# Patient Record
Sex: FEMALE | Race: WHITE | NOT HISPANIC OR LATINO | Employment: OTHER | URBAN - METROPOLITAN AREA
[De-identification: names, ages, dates, MRNs, and addresses within clinical notes are randomized per-mention and may not be internally consistent; named-entity substitution may affect disease eponyms.]

---

## 2017-02-17 ENCOUNTER — GENERIC CONVERSION - ENCOUNTER (OUTPATIENT)
Dept: OTHER | Facility: OTHER | Age: 82
End: 2017-02-17

## 2017-03-30 ENCOUNTER — NEW REFERRAL (OUTPATIENT)
Dept: URBAN - METROPOLITAN AREA CLINIC 27 | Facility: CLINIC | Age: 82
End: 2017-03-30

## 2017-03-30 DIAGNOSIS — H35.3211: ICD-10-CM

## 2017-03-30 DIAGNOSIS — H43.813: ICD-10-CM

## 2017-03-30 DIAGNOSIS — H35.3122: ICD-10-CM

## 2017-03-30 DIAGNOSIS — H35.61: ICD-10-CM

## 2017-03-30 PROCEDURE — 92235 FLUORESCEIN ANGRPH MLTIFRAME: CPT

## 2017-03-30 PROCEDURE — 2019F DILATED MACUL EXAM DONE: CPT

## 2017-03-30 PROCEDURE — G8427 DOCREV CUR MEDS BY ELIG CLIN: HCPCS

## 2017-03-30 PROCEDURE — 1036F TOBACCO NON-USER: CPT

## 2017-03-30 PROCEDURE — 92225 OPHTHALMOSCOPY (INITIAL): CPT

## 2017-03-30 PROCEDURE — 92134 CPTRZ OPH DX IMG PST SGM RTA: CPT

## 2017-03-30 PROCEDURE — 99204 OFFICE O/P NEW MOD 45 MIN: CPT | Mod: 25

## 2017-03-30 PROCEDURE — 67028 INJECTION EYE DRUG: CPT

## 2017-03-30 PROCEDURE — 4177F TALK PT/CRGVR RE AREDS PREV: CPT

## 2017-03-30 PROCEDURE — 92250 FUNDUS PHOTOGRAPHY W/I&R: CPT

## 2017-03-30 ASSESSMENT — TONOMETRY
OS_IOP_MMHG: 21
OD_IOP_MMHG: 20

## 2017-03-30 ASSESSMENT — VISUAL ACUITY: OS_SC: 20/30

## 2017-04-27 ENCOUNTER — FOLLOW UP (OUTPATIENT)
Dept: URBAN - METROPOLITAN AREA CLINIC 27 | Facility: CLINIC | Age: 82
End: 2017-04-27

## 2017-04-27 DIAGNOSIS — H35.3211: ICD-10-CM

## 2017-04-27 DIAGNOSIS — H35.61: ICD-10-CM

## 2017-04-27 PROCEDURE — 1036F TOBACCO NON-USER: CPT

## 2017-04-27 PROCEDURE — 2019F DILATED MACUL EXAM DONE: CPT

## 2017-04-27 PROCEDURE — 92012 INTRM OPH EXAM EST PATIENT: CPT | Mod: 25

## 2017-04-27 PROCEDURE — 67028 INJECTION EYE DRUG: CPT

## 2017-04-27 PROCEDURE — 92134 CPTRZ OPH DX IMG PST SGM RTA: CPT

## 2017-04-27 PROCEDURE — 4177F TALK PT/CRGVR RE AREDS PREV: CPT

## 2017-04-27 PROCEDURE — G8428 CUR MEDS NOT DOCUMENT: HCPCS

## 2017-04-27 ASSESSMENT — TONOMETRY: OD_IOP_MMHG: 19

## 2017-04-27 ASSESSMENT — VISUAL ACUITY: OD_SC: CF 1FT

## 2017-08-03 ENCOUNTER — FOLLOW UP (OUTPATIENT)
Dept: URBAN - METROPOLITAN AREA CLINIC 27 | Facility: CLINIC | Age: 82
End: 2017-08-03

## 2017-08-03 DIAGNOSIS — H35.3231: ICD-10-CM

## 2017-08-03 DIAGNOSIS — H35.61: ICD-10-CM

## 2017-08-03 PROCEDURE — 1036F TOBACCO NON-USER: CPT

## 2017-08-03 PROCEDURE — 92134 CPTRZ OPH DX IMG PST SGM RTA: CPT

## 2017-08-03 PROCEDURE — 92235 FLUORESCEIN ANGRPH MLTIFRAME: CPT

## 2017-08-03 PROCEDURE — 67028 INJECTION EYE DRUG: CPT

## 2017-08-03 PROCEDURE — G8427 DOCREV CUR MEDS BY ELIG CLIN: HCPCS

## 2017-08-03 PROCEDURE — 2019F DILATED MACUL EXAM DONE: CPT

## 2017-08-03 PROCEDURE — 4177F TALK PT/CRGVR RE AREDS PREV: CPT

## 2017-08-03 PROCEDURE — 92014 COMPRE OPH EXAM EST PT 1/>: CPT | Mod: 25

## 2017-08-03 ASSESSMENT — VISUAL ACUITY
OS_SC: 20/50
OD_SC: CF 1FT

## 2017-08-03 ASSESSMENT — TONOMETRY
OS_IOP_MMHG: 22
OD_IOP_MMHG: 22

## 2017-09-07 ENCOUNTER — FOLLOW UP (OUTPATIENT)
Dept: URBAN - METROPOLITAN AREA CLINIC 27 | Facility: CLINIC | Age: 82
End: 2017-09-07

## 2017-09-07 DIAGNOSIS — H35.61: ICD-10-CM

## 2017-09-07 DIAGNOSIS — H35.3231: ICD-10-CM

## 2017-09-07 PROCEDURE — G8428 CUR MEDS NOT DOCUMENT: HCPCS

## 2017-09-07 PROCEDURE — 92134 CPTRZ OPH DX IMG PST SGM RTA: CPT

## 2017-09-07 PROCEDURE — 67028 INJECTION EYE DRUG: CPT

## 2017-09-07 PROCEDURE — 92012 INTRM OPH EXAM EST PATIENT: CPT | Mod: 25

## 2017-09-07 PROCEDURE — 4177F TALK PT/CRGVR RE AREDS PREV: CPT

## 2017-09-07 PROCEDURE — 2019F DILATED MACUL EXAM DONE: CPT

## 2017-09-07 PROCEDURE — 1036F TOBACCO NON-USER: CPT

## 2017-09-07 ASSESSMENT — VISUAL ACUITY
OS_SC: 20/50
OD_SC: CF 2FT
OS_PH: 20/40-2

## 2017-09-07 ASSESSMENT — TONOMETRY
OD_IOP_MMHG: 22
OS_IOP_MMHG: 21

## 2017-10-19 ENCOUNTER — FOLLOW UP (OUTPATIENT)
Dept: URBAN - METROPOLITAN AREA CLINIC 27 | Facility: CLINIC | Age: 82
End: 2017-10-19

## 2017-10-19 DIAGNOSIS — H35.3231: ICD-10-CM

## 2017-10-19 DIAGNOSIS — H35.61: ICD-10-CM

## 2017-10-19 PROCEDURE — 92134 CPTRZ OPH DX IMG PST SGM RTA: CPT

## 2017-10-19 PROCEDURE — G8428 CUR MEDS NOT DOCUMENT: HCPCS

## 2017-10-19 PROCEDURE — 4177F TALK PT/CRGVR RE AREDS PREV: CPT

## 2017-10-19 PROCEDURE — 67028 INJECTION EYE DRUG: CPT

## 2017-10-19 PROCEDURE — 92012 INTRM OPH EXAM EST PATIENT: CPT | Mod: 25

## 2017-10-19 PROCEDURE — G9744 PT NOT ELI D/T ACT DIG HTN: HCPCS

## 2017-10-19 PROCEDURE — 2019F DILATED MACUL EXAM DONE: CPT

## 2017-10-19 PROCEDURE — 1036F TOBACCO NON-USER: CPT

## 2017-10-19 ASSESSMENT — VISUAL ACUITY
OS_SC: 20/40-
OS_PH: 20/30-

## 2017-10-19 ASSESSMENT — TONOMETRY
OS_IOP_MMHG: 19
OD_IOP_MMHG: 19

## 2018-01-04 ENCOUNTER — GENERIC CONVERSION - ENCOUNTER (OUTPATIENT)
Dept: OTHER | Facility: OTHER | Age: 83
End: 2018-01-04

## 2018-01-09 NOTE — RESULT NOTES
Verified Results  (1) COMPREHENSIVE METABOLIC PANEL [id number] 60:63MM [facility] Sa     Test Name Result Flag Reference   GLUCOSE,RANDM 127 mg/dL     If the patient is fasting, the ADA then defines impaired fasting glucose as > 100 mg/dL and diabetes as > or equal to 123 mg/dL  SODIUM 142 mmol/L  136-145   POTASSIUM 3 7 mmol/L  3 5-5 3   CHLORIDE 107 mmol/L  100-108   CARBON DIOXIDE 26 mmol/L  21-32   ANION GAP (CALC) 9 mmol/L  4-13   BLOOD UREA NITROGEN 14 mg/dL  5-25   CREATININE 0 90 mg/dL  0 60-1 30   Standardized to IDMS reference method   CALCIUM 8 6 mg/dL  8 3-10 1   BILI, TOTAL 0 40 mg/dL  0 20-1 00   ALK PHOSPHATAS 120 U/L H    ALT (SGPT) 17 U/L  12-78   AST(SGOT) 19 U/L  5-45   ALBUMIN 2 9 g/dL L 3 5-5 0   TOTAL PROTEIN 6 3 g/dL L 6 4-8 2   eGFR Non-African American 59 4 ml/min/1 73sq [facility] Disease Education Program recommendations are as follows:  GFR calculation is accurate only with a steady state creatinine  Chronic Kidney disease less than 60 ml/min/1 73 sq  meters  Kidney failure less than 15 ml/min/1 73 sq  meters       (1) URINALYSIS (will reflex a microscopy if leukocytes, occult blood, protein or nitrites are not within normal limits) [id number] 05:50AM [employer]     Test Name Result Flag Reference   COLOR Yellow     CLARITY Clear     SPECIFIC GRAVITY UA 1 010  1 000-1 030   PH UA 6 5  5 0-9 0   LEUKOCYTE ESTERASE UA Negative  Negative   NITRITE UA Negative  Negative   PROTEIN UA Negative mg/dl  Negative   GLUCOSE UA Negative mg/dl  Negative   KETONES UA Negative mg/dl  Negative   UROBILINOGEN UA 0 2 E U /dl  0 2, 1 0 E U /dl   BILIRUBIN UA Negative  Negative   BLOOD UA Negative  Negative     (1) CBC/ PLT (NO DIFF) [id number] 05:50AM Mayme DefenCall     Test Name Result Flag Reference   HEMATOCRIT 42 8 %  37 0-47 0   HEMOGLOBIN 13 7 g/dL  12 0-16 0   MCHC 32 1 g/dL  31 4-37 4   MCH 27 9 pg  27 0-31 0   MCV 87 fL  82-98   PLATELET COUNT 019 Thousands/uL  130-400   RBC COUNT 4 92 Million/uL  4 20-5  40   RDW 16 4 % H 11 6-15 1   WBC COUNT 8 50 Thousand/uL  4 80-10 80   MPV 9 7 fL  8 9-12 7

## 2018-01-10 NOTE — RESULT NOTES
Verified Results  (1) CBC/PLT/DIFF 27Apr2016 06:00AM [employer]     Test Name Result Flag Reference   WBC COUNT 8 10 Thousand/uL  4 80-10 80   WBC ADJUSTED 8 10 Thousand/uL  4 80-10 80   RBC COUNT 4 51 Million/uL  4 20-5 40   HEMOGLOBIN 13 1 g/dL  12 0-16 0   HEMATOCRIT 40 0 %  37 0-47 0   MCV 89 fL  82-98   MCH 29 1 pg  27 0-31 0   MCHC 32 7 g/dL  31 4-37 4   RDW 15 2 % H 11 6-15 1   MPV 9 8 fL  8 9-12 7   PLATELET COUNT 209 Thousands/uL  130-400   NEUTROPHILS RELATIVE PERCENT 68 %  43-75   LYMPHOCYTES RELATIVE PERCENT 18 %  14-44   MONOCYTES RELATIVE PERCENT 7 %  4-12   EOSINOPHILS RELATIVE PERCENT 7 % H 0-6   BASOPHILS RELATIVE PERCENT 0 %  0-1   NEUTROPHILS ABSOLUTE COUNT 5 50 Thousands/µL  1 85-7 62   LYMPHOCYTES ABSOLUTE COUNT 1 40 Thousands/µL  0 60-4 47   MONOCYTES ABSOLUTE COUNT 0 60 Thousand/µL  0 17-1 22   EOSINOPHILS ABSOLUTE COUNT 0 60 Thousand/µL  0 00-0 61   BASOPHILS ABSOLUTE COUNT 0 00 Thousands/µL  0 00-0 10     (1) COMPREHENSIVE METABOLIC PANEL [id number] 44:56XM [employer]   National Kidney Disease Education Program recommendations are as follows:  GFR calculation is accurate only with a steady state creatinine  Chronic Kidney disease less than 60 ml/min/1 73 sq  meters  Kidney failure less than 15 ml/min/1 73 sq  meters  Test Name Result Flag Reference   GLUCOSE,RANDM 134 mg/dL     If the patient is fasting, the ADA then defines impaired fasting glucose as > 100 mg/dL and diabetes as > or equal to 123 mg/dL     SODIUM 145 mmol/L  136-145   POTASSIUM 3 0 mmol/L L 3 5-5 3   CHLORIDE 107 mmol/L  100-108   CARBON DIOXIDE 30 mmol/L  21-32   ANION GAP (CALC) 8 mmol/L  4-13   BLOOD UREA NITROGEN 20 mg/dL  5-25   CREATININE 0 80 mg/dL  0 60-1 30   Standardized to IDMS reference method   CALCIUM 9 2 mg/dL  8 3-10 1   BILI, TOTAL 0 40 mg/dL  0 20-1 00   ALK PHOSPHATAS 183 U/L H    ALT (SGPT) 15 U/L  12-78   AST(SGOT) 22 U/L  5-45   ALBUMIN 2 8 g/dL L 3 5-5 0   TOTAL PROTEIN 6 3 g/dL L 6  4-8 2   eGFR Non-African American      >60 0 ml/min/1 73sq m     (1) HEMOGLOBIN A1C [id number] 06:00AM Mauro Plascencia   5 7-6 4% impaired fasting glucose  >=6 5% diagnosis of diabetes    Falsely low levels are seen in conditions linked to short RBC life span-  hemolytic anemia, and splenomegaly  Falsely elevated levels are seen in situations where there is an increased production of RBC- receipt of erythropoietin or blood transfusions  Adopted from ADA-Clinical Practice Recommendations     Test Name Result Flag Reference   HEMOGLOBIN A1C 6 9 % H 4 0-5 6   EST  AVG   GLUCOSE 151 mg/dl

## 2018-01-10 NOTE — MISCELLANEOUS
Message  Message Free Text Note Form: T/C from Becca Thompson pt with worsening confusion over the past 24 hours  Afebrile, VSS, urine dip + for WBC and RBC  Cipro 250mg BID x7 ordered        Signatures   Electronically signed by : Tristan Cedillo; Mar 13 2016  4:36PM EST                       (Author)    Electronically signed by : Cassy Carbone DO; Jun 1 2016  7:30PM EST                       (Review)

## 2018-01-11 ENCOUNTER — FOLLOW UP (OUTPATIENT)
Dept: URBAN - METROPOLITAN AREA CLINIC 27 | Facility: CLINIC | Age: 83
End: 2018-01-11

## 2018-01-11 DIAGNOSIS — H35.3231: ICD-10-CM

## 2018-01-11 DIAGNOSIS — H35.61: ICD-10-CM

## 2018-01-11 PROCEDURE — 92134 CPTRZ OPH DX IMG PST SGM RTA: CPT

## 2018-01-11 PROCEDURE — 67028 INJECTION EYE DRUG: CPT

## 2018-01-11 PROCEDURE — 92014 COMPRE OPH EXAM EST PT 1/>: CPT | Mod: 25

## 2018-01-11 ASSESSMENT — VISUAL ACUITY
OS_PH: 20/30
OS_SC: 20/40-

## 2018-01-11 ASSESSMENT — TONOMETRY
OS_IOP_MMHG: 23
OD_IOP_MMHG: 22

## 2018-01-11 NOTE — RESULT NOTES
Verified Results  * XR SPINE THORACIC 3 VIEW [id number] 12:00AM Burnadette Gangkrerman     Test Name Result Flag Reference   XR SPINE THORACIC 3 VW (Report)     THORACIC SPINE     INDICATION: Fall, posterior thoracic pain      COMPARISON: None     VIEWS: AP, lateral and coned-down projections; 3 images     FINDINGS:     There is dextroscoliosis of the lower thoracic spine  There is no subluxation      There is no fracture or pathologic bone lesion  Discogenic degenerative changes of the spine noted as well as bony osteopenia      The pedicles are intact  IMPRESSION:     No evidence of compression fracture       Signed by:   Judy Nugent MD   1/25/16     XR RIBS 2 VIEW RIGHT 25Jan2016 12:00AM Burnadette Gangkrerman     Test Name Result Flag Reference   XR RIBS 2 VW RIGHT (Report)     RIGHT RIBS AND CHEST DUAL-ENERGY     INDICATION: Patient fell, right posterior back pain     COMPARISON: None     VIEWS: Frontal chest with dual-energy technique, and 3 views right hemithorax; 6 images     FINDINGS:     The cardiomediastinal silhouette is unremarkable  Lungs are clear  No pleural effusions  There is no pneumothorax  No rib fractures are identified  IMPRESSION:     1  No active pulmonary disease  2  No evidence of rib fractures  Signed by:   Judy Nugent MD   1/25/16     * XR SPINE LUMBAR 2 OR 3 VIEWS 25Jan2016 12:00AM [employer] Gangkrerman     Test Name Result Flag Reference   XR SPINE LUMBAR 2 OR 3 VIEWS (Report)     LUMBAR SPINE     INDICATION: Fall, low back pain     COMPARISON: 3/6/2012     VIEWS: AP, lateral and coned-down projections; 3 images     FINDINGS:     Rotatory levoscoliosis of lumbar spine noted  There is no subluxation  There is no radiographic evidence of acute fracture or destructive osseous lesion  Multilevel degenerative disc disease of the lumbar spine noted as well as bilateral facet joint arthritis     Visualized soft tissues appear unremarkable         IMPRESSION:     No evidence of compression fracture or subluxation     Signed by:   Ming Chakraborty MD   1/25/16

## 2018-01-11 NOTE — RESULT NOTES
Verified Results  (1) URINE CULTURE 13Jun2016 05:50AM Natan Morales     Test Name Result Flag Reference   CLINICAL REPORT (Report)     Test:        Urine culture  Specimen Type:   Urine  Specimen Date:   6/13/2016 5:50 AM  Result Date:    6/14/2016 8:08 AM  Result Status:   Final result  Resulting Lab:   [place] [address]            Tel: [phone]      CULTURE                                       ------------------                                   No Growth <1000 cfu/mL

## 2018-01-11 NOTE — RESULT NOTES
Verified Results  * MRI BRAIN MEMORY WO CONTRAST 22Apr2016 12:54PM Juan David Clayton     Test Name Result Flag Reference   MRI BRAIN MEMORY WO CONTRAST (Report)     This is a summary report  The complete report is available in the patient's medical record  If you cannot access the medical record, please contact the sending organization for a detailed fax or copy  MRI BRAIN WITHOUT CONTRAST     INDICATION: Worsening dementia  Change in mental status  COMPARISON:  8/13/2012     TECHNIQUE: Sagittal T1, axial T2, axial FLAIR, axial T1, axial [place] and axial diffusion imaging  Coronal T2     IMAGE QUALITY: Diagnostic  FINDINGS:     BRAIN PARENCHYMA: There is no evidence of intra-axial or extra-axial hemorrhage  No midline shift or mass-effect is demonstrated  The ventricular system is not dilated out of proportion to the degree of parenchymal volume loss  The cerebral parenchyma    demonstrates signal abnormality in the periventricular white matter, compatible with mild microangiopathy  Age-appropriate parenchymal involution noted  The cerebellar tonsils are normal in position  There is no diffusion restriction to suggest recent infarction  VENTRICLES: Diffuse ventricular prominence is stable  SELLA AND PITUITARY GLAND: Normal      ORBITS: Bilateral lens implants noted     PARANASAL SINUSES: Normal      VASCULATURE: Evaluation of the major intracranial vasculature demonstrates appropriate flow voids  CALVARIUM AND SKULL BASE: Normal      EXTRACRANIAL SOFT TISSUES: Normal        IMPRESSION:       1  Minimal, chronic microangiopathy is stable  2  Stable diffuse ventricular prominence   3  No acute infarction, intracranial hemorrhage or mass effect  Workstation performed: [id number]     Signed by:    Renan Ceja MD   4/22/16

## 2018-01-12 ENCOUNTER — GENERIC CONVERSION - ENCOUNTER (OUTPATIENT)
Dept: OTHER | Facility: OTHER | Age: 83
End: 2018-01-12

## 2018-01-13 NOTE — RESULT NOTES
Verified Results  (1) URINALYSIS (will reflex a microscopy if leukocytes, occult blood, protein or nitrites are not within normal limits) [id number] 07:00PM [employer]     Test Name Result Flag Reference   COLOR Yellow     CLARITY Clear     SPECIFIC GRAVITY UA 1 025  1 000-1 030   PH UA 6 0  5 0-9 0   LEUKOCYTE ESTERASE UA Negative  Negative   NITRITE UA Negative  Negative   PROTEIN UA Negative mg/dl  Negative   GLUCOSE UA Negative mg/dl  Negative   KETONES UA Trace mg/dl A Negative   UROBILINOGEN UA 0 2 E U /dl A 0 2, 1 0 E U /dl   BILIRUBIN UA Negative  Negative   BLOOD UA Trace-lysed A Negative     (1) URINALYSIS (will reflex a microscopy if leukocytes, occult blood, protein or nitrites are not within normal limits) [id number] 07:00PM [employer]     Test Name Result Flag Reference   BACTERIA Occasional /hpf  None Seen, Occasional   EPITHELIAL CELLS Occasional /hpf  None Seen, Occasional   HYALINE CASTS 2-4 /lpf A (none)   RBC UA None Seen /hpf  None Seen   WBC UA 0-1 /hpf A None Seen

## 2018-01-16 NOTE — RESULT NOTES
Verified Results  (1) BASIC METABOLIC PROFILE [id number] 05:55AM Jenelle Salt     Test Name Result Flag Reference   GLUCOSE,RANDM 129 mg/dL     If the patient is fasting, the ADA then defines impaired fasting glucose as > 100 mg/dL and diabetes as > or equal to 123 mg/dL  SODIUM 142 mmol/L  136-145   POTASSIUM 3 8 mmol/L  3 5-5 3   CHLORIDE 109 mmol/L H 100-108   CARBON DIOXIDE 27 mmol/L  21-32   ANION GAP (CALC) 6 mmol/L  4-13   BLOOD UREA NITROGEN 19 mg/dL  5-25   CREATININE 0 80 mg/dL  0 60-1 30   Standardized to IDMS reference method   CALCIUM 8 4 mg/dL  8 3-10 1   eGFR Non-African American      >60 0 ml/min/1 73sq [facility] Disease Education Program recommendations are as follows:  GFR calculation is accurate only with a steady state creatinine  Chronic Kidney disease less than 60 ml/min/1 73 sq  meters  Kidney failure less than 15 ml/min/1 73 sq  meters

## 2018-01-16 NOTE — RESULT NOTES
Verified Results  (1) LIPID PANEL, FASTING [id number] 06:00AM Cesar Dejesus     Test Name Result Flag Reference   CHOLESTEROL 178 mg/dL     HDL,DIRECT 49 mg/dL  40-60   Specimen collection should occur prior to Metamizole administration due to the potential for falsely depressed results  LDL CHOLESTEROL CALCULATED 93 mg/dL  0-100   Triglyceride:         Normal              <150 mg/dl       Borderline High    150-199 mg/dl       High               200-499 mg/dl       Very High          >499 mg/dl  Cholesterol:         Desirable        <200 mg/dl      Borderline High  200-239 mg/dl      High             >239 mg/dl  HDL Cholesterol:        High    >59 mg/dL      Low     <41 mg/dL  LDL CALCULATED:    This screening LDL is a calculated result  It does not have the accuracy of the Direct Measured LDL in the monitoring of patients with hyperlipidemia and/or statin therapy  Direct Measure LDL (SGM647) must be ordered separately in these patients  TRIGLYCERIDES 180 mg/dL H <=150   Specimen collection should occur prior to N-Acetylcysteine or Metamizole administration due to the potential for falsely depressed results  (1) HEMOGLOBIN A1C [id number] 06:00AM Cesar Dejesus     Test Name Result Flag Reference   HEMOGLOBIN A1C 7 0 % H 4 2-6 3   EST  AVG   GLUCOSE 154 mg/dl

## 2018-01-16 NOTE — MISCELLANEOUS
Message  Message Free Text Note Form: [facility], pt had unwitnessed fall getting out of bed this evening  The bed alarm did not go off  There are mattresses on floor on either side of her bed  She has a skin tear to her right forearm and is not complaining of any pain  No neuro changes  Staff notified family and neuro checks initiated  Signatures   Electronically signed by : Sepideh Morales;  Apr 10 2016  9:44PM EST                       (Author)    Electronically signed by : Ce Garcia DO; Jun 1 2016  7:29PM EST                       (Review)

## 2018-01-16 NOTE — RESULT NOTES
Verified Results  (1) URINALYSIS (will reflex a microscopy if leukocytes, occult blood, protein or nitrites are not within normal limits) [id number] 12:00PM Suzanna Palacios     Test Name Result Flag Reference   COLOR Light Yellow     CLARITY Slightly Cloudy     SPECIFIC GRAVITY UA 1 015  1 000-1 030   PH UA 7 0  5 0-9 0   LEUKOCYTE ESTERASE UA Negative  Negative   NITRITE UA Negative  Negative   PROTEIN UA Negative mg/dl  Negative   GLUCOSE UA Negative mg/dl  Negative   KETONES UA Negative mg/dl  Negative   UROBILINOGEN UA 0 2 E U /dl A 0 2, 1 0 E U /dl   BILIRUBIN UA Negative  Negative   BLOOD UA Negative  Negative

## 2018-01-17 NOTE — RESULT NOTES
Verified Results  (1) URINE CULTURE [id number] 07:00PM Nga Kinney     Test Name Result Flag Reference   CLINICAL REPORT (Report)     Test:        Urine culture  Specimen Type:   Urine  Specimen Date:   3/18/2016 7:00 PM  Result Date:    3/19/2016 6:38 PM  Result Status:   Final result  Resulting Lab:   [facility] [address] [place] [address]            Tel: [phone]                 CULTURE                                       ------------------                                   No Growth <1000 cfu/mL

## 2018-01-23 NOTE — CONSULTS
Chief Complaint    1  Shoulder Pain    History of Present Illness   Alethea Shepard is an 59-year-old female who is referred to see me today from the hospital after having suffered a left shoulder dislocation 2 weeks ago  She had a reduction performed in the emergency room and did well with that  Her pain is now mild and dull and intermittent  It was made better with the reduction and with the use of a sling and had been worse with a lot of movement of the shoulder  It can radiate down and up into the neck  She denies any numbness  Review of Systems  Focused-Female Orthopedics:   Constitutional: No fever, no chills, feels well, no tiredness, no recent weight gain or loss  Eyes: No complaints of eyesight problems, no red eyes  ENT: no loss of hearing, no nosebleeds, no sore throat  Cardiovascular: No complaints of chest pain, no palpitations, no leg claudication or lower extremity edema  Respiratory: no compliants of shortness of breath, no wheezing, no cough  Gastrointestinal: no complaints of abdominal pain, no constipation, no nausea or diarrhea, no vomiting, no bloody stools  Genitourinary: no complaints of dysuria, no incontinence  Musculoskeletal: as noted in HPI  Integumentary: no complaints of skin rash or lesion, no itching or dry skin, no skin wounds  Neurological: no complaints of headache, no confusion, no numbness or tingling, no dizziness  Endocrine: No complaints of muscle weakness, no feelings of weakness, no frequent urination, no excessive thirst    Psychiatric: No suicidal thoughts, no anxiety, no feelings of depression  Active Problems    1  Allergic rhinitis (477 9) (J30 9)   2  Alzheimers disease (331 0) (G30 9)   3  Anxiety disorder (300 00) (F41 9)   4  Arthropathy (716 90) (M12 9)   5  Atrial fibrillation (427 31) (I48 91)   6  Benign essential hypertension (401 1) (I10)   7  Bunion, left foot (727 1) (M20 12)   8  Cataract (366 9) (H26 9)   9   Chronic kidney disease, stage I (585 1) (N18 1)   10  Contusion of right back wall of thorax, initial encounter (922 33) (S20 221A)   11  Depression (311) (F32 9)   12  Difficulty in walking (719 7) (R26 2)   13  Disc degeneration, 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)   14  Dislocation of left shoulder joint, subsequent encounter (V58 89) (S43 005D)   15  Essential hypertriglyceridemia (272 1) (E78 1)   16  Fatigue (780 79) (R53 83)   17  Hyperlipidemia (272 4) (E78 5)   18  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)   19  Initial Medicare annual wellness visit (V70 0) (Z00 00)   20  Insomnia (780 52) (G47 00)   21  Left shoulder pain (719 41) (M25 512)   22  Lumbago (724 2) (M54 5)   23  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)   24  Need for influenza vaccination (V04 81) (Z23)   25  Need for vaccination for DTP (V06 1) (Z23)   26  Overactive bladder (596 51) (N32 81)   27  Overweight (278 02) (E66 3)   28  Premature ventricular contraction (427 69) (I49 3)   29  Radius and ulna distal fracture (813 44) (S52 509A,S52 609A)   30  Recurrent falls (V15 88) (R29 6)   31  Right ankle pain (719 47) (M25 571)   32  Screening for osteoporosis (V82 81) (Z13 820)   33  Tear of skin of right wrist, initial encounter (881 02) (S61 511A)   34  Type 2 diabetes mellitus with renal manifestations, 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)   35  Urinary incontinence (788 30) (R32)    Past Medical History    1  History of Abdominal pain, epigastric (789 06) (R10 13)   2  History of Acute upper respiratory infection (465 9) (J06 9)   3  Ankle pain, unspecified laterality   4  History of Changes in skin texture (782 8) (R23 4)   5  History of Closed Fracture Of The Lateral Malleolus (824 2)   6  History of acute bronchitis (V12 69) (Z87 09)   7  History of acute sinusitis (V12 69) (Z87 09)   8  History of dermatitis (V13 3) (Z87 2)   9  Impacted cerumen, unspecified laterality (380 4) (H61 20)   10  Impaired fasting glucose (790 21) (R73 01)   11   Influenza vaccine needed (V04 81) (Z23)   12  History of Labyrinthine dysfunction (386 50) (H83 2X9)   13  Need for pneumococcal vaccination (V03 82) (Z23)   14  Screening for genitourinary condition (V81 6) (Z13 89)   15  Screening for neurological condition (V80 09) (Z13 89)  Active Problems And Past Medical History Reviewed: The active problems and past medical history were reviewed and updated today  Surgical History    1  History of Ankle Surgery   2  History of Cholecystectomy   3  History of Hysterectomy   4  History of Radical Pancreaticoduodenectomy  Surgical History Reviewed: The surgical history was reviewed and updated today  Family History    1  Family history of cardiac disorder (V17 49) (Z82 49)   2  No pertinent family history    3  No pertinent family history  Family History Reviewed: The family history was reviewed and updated today  Social History    · Former smoker (P17 41) (W09 773)   · Never a smoker   · No alcohol use  Social History Reviewed: The social history was reviewed and updated today  Current Meds   1  AmLODIPine Besylate 10 MG Oral Tablet; take one tablet by mouth every day; Therapy: 81KME1030 to (Evaluate:25Ovd1923)  Requested for: 27TDZ2864; Last   Rx:05Jan2016 Ordered   2  Donepezil HCl - 10 MG Oral Tablet (Aricept); Take 1 tablet daily  Requested for:   66QSI5530; Last Rx:05Jan2016 Ordered   3  Donepezil HCl - 10 MG Oral Tablet; take one tablet by mouth every day; Therapy: 88HRB2313 to (Evaluate:83Vyx7700)  Requested for: 80RAH8548; Last   Rx:39Tud8364 Ordered   4  DULoxetine HCl - 60 MG Oral Capsule Delayed Release Particles (Cymbalta); TAKE 2   CAPSULE Daily  Requested for: 10VDK2666; Last Rx:05Jan2016 Ordered   5  Memantine HCl - 5 MG Oral Tablet (Namenda); TAKE 1 TABLET ONCE DAILY; Therapy: 58VSU0633 to (Kavitha Perez)  Requested for: 19RUW1900; Last   Rx:05Jan2016 Ordered   6  MetFORMIN HCl - 500 MG Oral Tablet; Take 1 tablet daily;    Therapy: 84WYK1324 to (Evaluate:33Wcn6664)  Requested for: 91KLZ6374; Last   Rx:05Jan2016 Ordered  Medication List Reviewed: The medication list was reviewed and updated today  Allergies    1  No Known Drug Allergies    Physical Exam    Forward flexion: painful restricted AROM 120 degrees  Abduction: painful restricted AROM 110 degrees  Internal rotation: painful restricted AROM  External rotation: painful normal AROM  Motor: 4/5 forward flexion, 4/5 abduction, 4/5 internal rotation and 4/5 external rotation  Special Tests: positive Painful Arc and equivocal Empty Can test, but negative Neer test, negative Anterior Load and Shift and negative Posterior Load and Shift  Left Shoulder Appearance[de-identified] Normal  Tenderness: None  Constitutional - General appearance: Normal    Musculoskeletal - Gait and station: Abnormal  Gait evaluation demonstrated non-weight bearing bilaterally  Digits and nails: Normal  Muscle strength/tone: Normal  Upper extremity compartments: Normal    Cardiovascular - Pulses: Normal  Examination of extremities for edema and/or varicosities: Normal    Skin - Skin and subcutaneous tissue: Normal    Neurologic - Sensation: Normal  Upper extremity peripheral neuro exam: Normal  Peripheral sensation findings: involved side light touch intact on the upper arm, involved side light touch intact on the forearm and involved side light touch intact on the digits  Neuromuscular strength examination findings: involved side normal axillary nerve motor function, involved side normal median nerve motor function, involved side normal musculcutaneous nerve motor function, involved side normal radial nerve motor function and involved side normal ulnar nerve motor function  Distal pulse examination findings: involved side 2+ radial pulse     Psychiatric - Orientation to person, place, and time: Normal  Mood and affect: Normal    Eyes   Conjunctiva and lids: Normal     Pupils and irises: Normal        Results/Data  Diagnostic Studies Reviewed: I personally reviewed the films/images/results in the office today  My interpretation follows  X-ray Review Located left glenohumeral joint no fracture found in the left and x-rays  Post reduction  Assessment    1  Dislocation of left shoulder joint, initial encounter (831 00) (S43 005A)    Plan   I did discuss with Sebastián Randolph and her family that this single dislocation of the left shoulder does not appear to be something that should be a recurrent episode  I have taken her out of her sling and encourage range of motion and physical therapy  She will follow up with me in 6 weeks  I do not believe she will need an operation for this although the rotator cuff certainly is at risk but I believe we can rehabilitate her conservatively  Discussion/Summary  Counseling Documentation With Imm: The patient, patient's family was counseled regarding diagnostic results, instructions for management, prognosis, patient and family education, impressions, risks and benefits of treatment options, importance of compliance with treatment        Future Appointments    Signatures   Electronically signed by : GEORGES Root ; Mar  9 2016  1:53PM EST                       (Author)

## 2018-01-24 VITALS
SYSTOLIC BLOOD PRESSURE: 122 MMHG | DIASTOLIC BLOOD PRESSURE: 68 MMHG | HEIGHT: 61 IN | WEIGHT: 150 LBS | BODY MASS INDEX: 28.32 KG/M2

## 2018-03-06 ENCOUNTER — TELEPHONE (OUTPATIENT)
Dept: PAIN MEDICINE | Facility: CLINIC | Age: 83
End: 2018-03-06

## 2018-03-06 ENCOUNTER — OFFICE VISIT (OUTPATIENT)
Dept: OBGYN CLINIC | Facility: CLINIC | Age: 83
End: 2018-03-06
Payer: MEDICARE

## 2018-03-06 VITALS
HEART RATE: 65 BPM | SYSTOLIC BLOOD PRESSURE: 117 MMHG | WEIGHT: 154 LBS | HEIGHT: 61 IN | BODY MASS INDEX: 29.07 KG/M2 | DIASTOLIC BLOOD PRESSURE: 48 MMHG

## 2018-03-06 DIAGNOSIS — G89.29 CHRONIC MIDLINE LOW BACK PAIN WITHOUT SCIATICA: ICD-10-CM

## 2018-03-06 DIAGNOSIS — M53.3 PAIN OF RIGHT SACROILIAC JOINT: Primary | ICD-10-CM

## 2018-03-06 DIAGNOSIS — M54.50 CHRONIC MIDLINE LOW BACK PAIN WITHOUT SCIATICA: ICD-10-CM

## 2018-03-06 PROCEDURE — 99213 OFFICE O/P EST LOW 20 MIN: CPT | Performed by: ORTHOPAEDIC SURGERY

## 2018-03-06 NOTE — TELEPHONE ENCOUNTER
Intake started at [facility] office patient ref by Dr Doug García for pain of rt side sacroiliac joint  Patient scheduled and new patient paperwork given

## 2018-03-06 NOTE — PROGRESS NOTES
Assessment/Plan:  1  Pain of right sacroiliac joint  Ambulatory referral to Pain Management   2  Chronic midline low back pain without sciatica         Indira Bermudez continues to have right-sided low back and buttock pain and I do believe most of her discomfort is around the SI joint on the right side  I have referred her to Dr Kayley Betancourt to discuss possible SI joint injection as this could help with her pain  She should continue physical therapy in remain as active as possible  Subjective:   Olivia Sharpe is a 80 y o  female who presents for follow-up for right-sided SI joint pain and midline low back pain  She has been doing physical therapy at [facility] for the past 6 weeks and states that she continues to have right-sided buttock pain that worsens when sleeping or moving  She does feel better in therapy but the pain does not improve when she is sedentary  Review of Systems   Constitutional: Negative for chills, fever and unexpected weight change  HENT: Negative for hearing loss, nosebleeds and sore throat  Eyes: Negative for pain, redness and visual disturbance  Respiratory: Negative for cough, shortness of breath and wheezing  Cardiovascular: Negative for chest pain, palpitations and leg swelling  Gastrointestinal: Negative for abdominal pain, nausea and vomiting  Endocrine: Negative for polydipsia and polyuria  Genitourinary: Negative for dysuria and hematuria  Skin: Negative for rash and wound  Neurological: Negative for dizziness, numbness and headaches  Psychiatric/Behavioral: Negative for decreased concentration and suicidal ideas  The patient is not nervous/anxious            Past Medical History:   Diagnosis Date    Arthritis     Atrial fibrillation ([facility] [address] [facility]     Questionable    Dementia     Diabetes mellitus ([facility] )     Hypertension     Psychiatric disorder        Past Surgical History:   Procedure Laterality Date    CHOLECYSTECTOMY      FRACTURE SURGERY      left wrist , plates and pins, right ankle plate and pins    HYSTERECTOMY      WHIPPLE PROCEDURE W/ LAPAROSCOPY         Family History   Problem Relation Age of Onset    Family history unknown: Yes       Social History     Occupational History    Not on file  Social History Main Topics    Smoking status: Never Smoker    Smokeless tobacco: Not on file    Alcohol use No    Drug use: No    Sexual activity: Not Currently         Current Outpatient Prescriptions:     acetaminophen (TYLENOL) 325 mg tablet, Take 2 tablets (650 mg total) by mouth every 6 (six) hours as needed for mild pain , Disp: 30 tablet, Rfl: 0    ALPRAZolam (XANAX) 0 5 mg tablet, Take 0 5 mg by mouth daily at bedtime as needed for anxiety, Disp: , Rfl:     amiodarone 100 mg tablet, Take 100 mg by mouth daily, Disp: , Rfl:     amLODIPine (NORVASC) 5 mg tablet, Take 5 mg by mouth daily, Disp: , Rfl:     apixaban (ELIQUIS) 2 5 mg, Take 2 5 mg by mouth 2 (two) times a day, Disp: , Rfl:     cefadroxil (DURICEF) 500 mg capsule, Take 500 mg by mouth 2 (two) times a day, Disp: , Rfl:     donepezil (ARICEPT) 10 mg tablet, Take 10 mg by mouth daily at bedtime  , Disp: , Rfl:     DULoxetine (CYMBALTA) 60 mg delayed release capsule, Take 60 mg by mouth daily  , Disp: , Rfl:     memantine (NAMENDA) 10 mg tablet, Take 5 mg by mouth daily  , Disp: , Rfl:     metFORMIN (GLUCOPHAGE) 500 mg tablet, Take 500 mg by mouth daily with breakfast , Disp: , Rfl:     potassium chloride (K-DUR,KLOR-CON) 10 mEq tablet, Take 10 mEq by mouth 2 (two) times a day, Disp: , Rfl:     No Known Allergies    Objective:  Vitals:    03/06/18 1408   BP: (!) 117/48   Pulse: 65       Back Exam     Tenderness   The patient is experiencing tenderness in the lumbar (Pinpoint tenderness to palpation over SI joint on the right)      Tests   Straight leg raise right: negative  Straight leg raise left: negative    Other   Sensation: normal  Gait: normal             Physical Exam Constitutional: She is oriented to person, place, and time  She appears well-developed and well-nourished  HENT:   Head: Normocephalic and atraumatic  Eyes: Conjunctivae are normal    Neck: Neck supple  Cardiovascular: Intact distal pulses  Pulmonary/Chest: Effort normal    Neurological: She is alert and oriented to person, place, and time  Skin: Skin is warm and dry  Psychiatric: She has a normal mood and affect  Her behavior is normal    Vitals reviewed

## 2018-03-27 ENCOUNTER — APPOINTMENT (OUTPATIENT)
Dept: RADIOLOGY | Facility: CLINIC | Age: 83
End: 2018-03-27
Payer: MEDICARE

## 2018-03-27 ENCOUNTER — CONSULT (OUTPATIENT)
Dept: PAIN MEDICINE | Facility: CLINIC | Age: 83
End: 2018-03-27
Payer: MEDICARE

## 2018-03-27 VITALS
HEART RATE: 68 BPM | DIASTOLIC BLOOD PRESSURE: 62 MMHG | TEMPERATURE: 97.8 F | RESPIRATION RATE: 18 BRPM | SYSTOLIC BLOOD PRESSURE: 132 MMHG | HEIGHT: 61 IN | BODY MASS INDEX: 29.07 KG/M2 | WEIGHT: 154 LBS

## 2018-03-27 DIAGNOSIS — M47.816 LUMBAR SPONDYLOSIS: ICD-10-CM

## 2018-03-27 DIAGNOSIS — G89.4 CHRONIC PAIN SYNDROME: Primary | ICD-10-CM

## 2018-03-27 DIAGNOSIS — G89.4 CHRONIC PAIN SYNDROME: ICD-10-CM

## 2018-03-27 DIAGNOSIS — G89.29 CHRONIC BILATERAL LOW BACK PAIN WITHOUT SCIATICA: ICD-10-CM

## 2018-03-27 DIAGNOSIS — M54.50 CHRONIC BILATERAL LOW BACK PAIN WITHOUT SCIATICA: ICD-10-CM

## 2018-03-27 PROCEDURE — 72114 X-RAY EXAM L-S SPINE BENDING: CPT

## 2018-03-27 PROCEDURE — 99204 OFFICE O/P NEW MOD 45 MIN: CPT | Performed by: ANESTHESIOLOGY

## 2018-03-27 NOTE — LETTER
March 27, 2018     Lorena Caruso, [address] [address]    Patient: Louise Rae   YOB: 1929   Date of Visit: 3/27/2018       Dear Dr Kari Patel: Thank you for referring Balwinder Palm to me for evaluation  Below are my notes for this consultation  If you have questions, please do not hesitate to call me  I look forward to following your patient along with you  Sincerely,        Bhanu Ambriz MD        CC: MD Bhanu Chambers MD  3/27/2018  2:41 PM  Sign at close encounter  Assessment:  1  Chronic pain syndrome    2  Chronic bilateral low back pain without sciatica    3  Lumbar spondylosis        Plan:  Orders Placed This Encounter   Procedures    XR spine lumbar complete w bending minimum 6 views     Standing Status:   Future     Standing Expiration Date:   3/27/2022     Scheduling Instructions:      Bring along any outside films relating to this procedure  Order Specific Question:   Reason for Exam:     Answer:   low back pain     My impressions and treatment recommendations were discussed in detail with the patient, who verbalized understanding and had no further questions  Given that the patient has signs and symptoms consistent with bilateral lumbar facet syndrome and lumbar spondylosis, discussed the rationale of undergoing bilateral L3, L4, L5, and S1 diagnostic and confirmatory medial branch blocks  The procedures, its risks, and benefits were explained in detail to the patient  Risks include but are not limited to bleeding, infection, hematoma formation, abscess formation, weakness, headache, failure the pain to improve, nerve irritation or damage, and potential worsening of the pain  The patient verbalized understanding and wished to proceed with the procedure      I did feel reasonable to obtain an x-ray of the lumbar spine to evaluate for any other pathology that may be contributing to the patient's pain complaints  Follow-up is planned in 4 weeks time or sooner as warranted  Discharge instructions were provided  I personally saw and examined the patient and I agree with the above discussed plan of care  History of Present Illness:    Sulema Waddell is a 80 y o  female who presents to [facility] for initial evaluation of the above stated pain complaints  The patient has a past medical and chronic pain history as outlined in the assessment section  She was referred by Dr Washington Mayo  The patient reports a 5 year history of low back pain  She describes her pain as moderate to severe and 7/10 on the verbal numerical pain rating scale  Her pain is intermittent in nature and worse in the evening and night  She describes her pain as primarily in her low back and nonradiating in nature  She does report that her pain is in sharp and shooting    The patient reports weakness in her lower extremities  She reports no pain relieving factors, but does state that lying down, standing, bending, walking, and relaxation increases her pain  The patient does report that physical therapy, home exercises, and heat/ice treatment all provided moderate pain relief  She is currently using Tylenol over-the-counter as needed for pain relief  She does report that her pain is improved some of the time with the Tylenol  She is currently a long-term resident at a nursing home  Review of Systems:    Review of Systems   Constitutional: Negative for fever and unexpected weight change  HENT: Negative for trouble swallowing  Eyes: Negative for visual disturbance  Respiratory: Negative for shortness of breath and wheezing  Cardiovascular: Negative for chest pain and palpitations  Gastrointestinal: Negative for constipation, diarrhea, nausea and vomiting  Endocrine: Negative for cold intolerance, heat intolerance and polydipsia  Genitourinary: Positive for difficulty urinating   Negative for frequency  Musculoskeletal: Positive for gait problem (decreased range of motion)  Negative for arthralgias, joint swelling and myalgias  Skin: Negative for rash  Neurological: Negative for dizziness, seizures, syncope, weakness and headaches  Hematological: Does not bruise/bleed easily  Psychiatric/Behavioral: Negative for dysphoric mood  All other systems reviewed and are negative  Patient Active Problem List   Diagnosis    Shoulder dislocation    Dizziness    Atrial fibrillation (HCC)    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 )     Questionable    Dementia     Diabetes mellitus ([facility] )     Hypertension     Psychiatric disorder        Past Surgical History:   Procedure Laterality Date    CHOLECYSTECTOMY      FRACTURE SURGERY      left wrist , plates and pins, right ankle plate and pins    HYSTERECTOMY      WHIPPLE PROCEDURE W/ LAPAROSCOPY         Family History   Problem Relation Age of Onset    Family history unknown: Yes       Social History     Occupational History    Not on file       Social History Main Topics    Smoking status: Never Smoker    Smokeless tobacco: Not on file    Alcohol use No    Drug use: No    Sexual activity: Not Currently         Current Outpatient Prescriptions:     acetaminophen (TYLENOL) 325 mg tablet, Take 2 tablets (650 mg total) by mouth every 6 (six) hours as needed for mild pain , Disp: 30 tablet, Rfl: 0    ALPRAZolam (XANAX) 0 5 mg tablet, Take 0 5 mg by mouth daily at bedtime as needed for anxiety, Disp: , Rfl:     amiodarone 100 mg tablet, Take 100 mg by mouth daily, Disp: , Rfl:     amLODIPine (NORVASC) 5 mg tablet, Take 5 mg by mouth daily, Disp: , Rfl:     apixaban (ELIQUIS) 2 5 mg, Take 2 5 mg by mouth 2 (two) times a day, Disp: , Rfl:     cefadroxil (DURICEF) 500 mg capsule, Take 500 mg by mouth 2 (two) times a day, Disp: , Rfl:     donepezil (ARICEPT) 10 mg tablet, Take 10 mg by mouth daily at bedtime  , Disp: , Rfl:     DULoxetine (CYMBALTA) 60 mg delayed release capsule, Take 60 mg by mouth daily  , Disp: , Rfl:     memantine (NAMENDA) 10 mg tablet, Take 5 mg by mouth daily  , Disp: , Rfl:     metFORMIN (GLUCOPHAGE) 500 mg tablet, Take 500 mg by mouth daily with breakfast , Disp: , Rfl:     potassium chloride (K-DUR,KLOR-CON) 10 mEq tablet, Take 10 mEq by mouth 2 (two) times a day, Disp: , Rfl:     No Known Allergies    Physical Exam:    /62 (BP Location: Left arm, Patient Position: Sitting, Cuff Size: Standard)   Pulse 68   Temp 97 8 °F (36 6 °C) (Oral)   Resp 18   Ht 5' 1" (1 549 m)   Wt 69 9 kg (154 lb)   BMI 29 10 kg/m²      Constitutional: normal, well developed, well nourished, alert, in no distress and non-toxic and no overt pain behavior  Eyes: anicteric  HEENT: grossly intact  Neck: supple, symmetric, trachea midline and no masses   Pulmonary:even and unlabored  Cardiovascular:No edema or pitting edema present  Skin:Normal without rashes or lesions and well hydrated  Psychiatric:Mood and affect appropriate  Neurologic:Cranial Nerves II-XII grossly intact  Musculoskeletal:in wheelchair, the patient rises from a seated to a standing position with significant difficulty, push-off, and delay  Gait is significantly antalgic      Lumbar Spine Exam    Appearance:  Normal lordosis  Palpation/Tenderness:  left lumbar paraspinal tenderness  right lumbar paraspinal tenderness  Sensory:  no sensory deficits noted  Range of Motion:  Flexion:  Severely limited  with pain  Extension:  Severely limited  with pain  Lateral Flexion - Left:  Severely limited  with pain  Lateral Flexion - Right:  Severely limited  with pain  Rotation - Left:  Severely limited  with pain  Rotation - Right:  Severely limited  with pain Lumbar facet loading is positive bilaterally    Motor Strength:  Left hip flexion:  5/5  Left hip extension:  5/5  Right hip flexion:  5/5  Right hip extension:  5/5  Left knee flexion:  5/5  Left knee extension:  5/5  Right knee flexion:  5/5  Right knee extension:  5/5  Left foot dorsiflexion:  5/5  Left foot plantar flexion:  5/5  Right foot dorsiflexion:  5/5  Right foot plantar flexion:  5/5  Reflexes:  Left Patellar:  2+   Right Patellar:  2+   Left Achilles:  2+   Right Achilles:  2+   Special Tests:  Left Straight Leg Test:  negative  Right Straight Leg Test:  negative  Left Rigo's Maneuver:  negative  Right Rigo's Maneuver:  negative    Imaging  XR spine lumbar complete w bending minimum 6 views    (Results Pending)       Orders Placed This Encounter   Procedures    XR spine lumbar complete w bending minimum 6 views

## 2018-03-27 NOTE — PROGRESS NOTES
Assessment:  1  Chronic pain syndrome    2  Chronic bilateral low back pain without sciatica    3  Lumbar spondylosis        Plan:  Orders Placed This Encounter   Procedures    XR spine lumbar complete w bending minimum 6 views     Standing Status:   Future     Standing Expiration Date:   3/27/2022     Scheduling Instructions:      Bring along any outside films relating to this procedure  Order Specific Question:   Reason for Exam:     Answer:   low back pain     My impressions and treatment recommendations were discussed in detail with the patient, who verbalized understanding and had no further questions  Given that the patient has signs and symptoms consistent with bilateral lumbar facet syndrome and lumbar spondylosis, discussed the rationale of undergoing bilateral L3, L4, L5, and S1 diagnostic and confirmatory medial branch blocks  The procedures, its risks, and benefits were explained in detail to the patient  Risks include but are not limited to bleeding, infection, hematoma formation, abscess formation, weakness, headache, failure the pain to improve, nerve irritation or damage, and potential worsening of the pain  The patient verbalized understanding and wished to proceed with the procedure  I did feel reasonable to obtain an x-ray of the lumbar spine to evaluate for any other pathology that may be contributing to the patient's pain complaints  Follow-up is planned in 4 weeks time or sooner as warranted  Discharge instructions were provided  I personally saw and examined the patient and I agree with the above discussed plan of care  History of Present Illness:    Nadia Villarreal is a 80 y o  female who presents to [facility] for initial evaluation of the above stated pain complaints  The patient has a past medical and chronic pain history as outlined in the assessment section  She was referred by Dr Gonzalez Poag      The patient reports a 5 year history of low back pain  She describes her pain as moderate to severe and 7/10 on the verbal numerical pain rating scale  Her pain is intermittent in nature and worse in the evening and night  She describes her pain as primarily in her low back and nonradiating in nature  She does report that her pain is in sharp and shooting    The patient reports weakness in her lower extremities  She reports no pain relieving factors, but does state that lying down, standing, bending, walking, and relaxation increases her pain  The patient does report that physical therapy, home exercises, and heat/ice treatment all provided moderate pain relief  She is currently using Tylenol over-the-counter as needed for pain relief  She does report that her pain is improved some of the time with the Tylenol  She is currently a long-term resident at a nursing home  Review of Systems:    Review of Systems   Constitutional: Negative for fever and unexpected weight change  HENT: Negative for trouble swallowing  Eyes: Negative for visual disturbance  Respiratory: Negative for shortness of breath and wheezing  Cardiovascular: Negative for chest pain and palpitations  Gastrointestinal: Negative for constipation, diarrhea, nausea and vomiting  Endocrine: Negative for cold intolerance, heat intolerance and polydipsia  Genitourinary: Positive for difficulty urinating  Negative for frequency  Musculoskeletal: Positive for gait problem (decreased range of motion)  Negative for arthralgias, joint swelling and myalgias  Skin: Negative for rash  Neurological: Negative for dizziness, seizures, syncope, weakness and headaches  Hematological: Does not bruise/bleed easily  Psychiatric/Behavioral: Negative for dysphoric mood  All other systems reviewed and are negative          Patient Active Problem List   Diagnosis    Shoulder dislocation    Dizziness    Atrial fibrillation ([address] [address]    Other benign neoplasm of skin of left upper limb, including shoulder    Chronic pain syndrome    Chronic bilateral low back pain without sciatica    Lumbar spondylosis       Past Medical History:   Diagnosis Date    Arthritis     Atrial fibrillation ([facility] [address] [facility]     Questionable    Dementia     Diabetes mellitus ([facility] [address] [facility]     Hypertension     Psychiatric disorder        Past Surgical History:   Procedure Laterality Date    CHOLECYSTECTOMY      FRACTURE SURGERY      left wrist , plates and pins, right ankle plate and pins    HYSTERECTOMY      WHIPPLE PROCEDURE W/ LAPAROSCOPY         Family History   Problem Relation Age of Onset    Family history unknown: Yes       Social History     Occupational History    Not on file  Social History Main Topics    Smoking status: Never Smoker    Smokeless tobacco: Not on file    Alcohol use No    Drug use: No    Sexual activity: Not Currently         Current Outpatient Prescriptions:     acetaminophen (TYLENOL) 325 mg tablet, Take 2 tablets (650 mg total) by mouth every 6 (six) hours as needed for mild pain , Disp: 30 tablet, Rfl: 0    ALPRAZolam (XANAX) 0 5 mg tablet, Take 0 5 mg by mouth daily at bedtime as needed for anxiety, Disp: , Rfl:     amiodarone 100 mg tablet, Take 100 mg by mouth daily, Disp: , Rfl:     amLODIPine (NORVASC) 5 mg tablet, Take 5 mg by mouth daily, Disp: , Rfl:     apixaban (ELIQUIS) 2 5 mg, Take 2 5 mg by mouth 2 (two) times a day, Disp: , Rfl:     cefadroxil (DURICEF) 500 mg capsule, Take 500 mg by mouth 2 (two) times a day, Disp: , Rfl:     donepezil (ARICEPT) 10 mg tablet, Take 10 mg by mouth daily at bedtime  , Disp: , Rfl:     DULoxetine (CYMBALTA) 60 mg delayed release capsule, Take 60 mg by mouth daily  , Disp: , Rfl:     memantine (NAMENDA) 10 mg tablet, Take 5 mg by mouth daily  , Disp: , Rfl:     metFORMIN (GLUCOPHAGE) 500 mg tablet, Take 500 mg by mouth daily with breakfast , Disp: , Rfl:     potassium chloride (K-DUR,KLOR-CON) 10 mEq tablet, Take 10 mEq by mouth 2 (two) times a day, Disp: , Rfl:     No Known Allergies    Physical Exam:    /62 (BP Location: Left arm, Patient Position: Sitting, Cuff Size: Standard)   Pulse 68   Temp 97 8 °F (36 6 °C) (Oral)   Resp 18   Ht 5' 1" (1 549 m)   Wt 69 9 kg (154 lb)   BMI 29 10 kg/m²     Constitutional: normal, well developed, well nourished, alert, in no distress and non-toxic and no overt pain behavior  Eyes: anicteric  HEENT: grossly intact  Neck: supple, symmetric, trachea midline and no masses   Pulmonary:even and unlabored  Cardiovascular:No edema or pitting edema present  Skin:Normal without rashes or lesions and well hydrated  Psychiatric:Mood and affect appropriate  Neurologic:Cranial Nerves II-XII grossly intact  Musculoskeletal:in wheelchair, the patient rises from a seated to a standing position with significant difficulty, push-off, and delay  Gait is significantly antalgic  Lumbar Spine Exam    Appearance:  Normal lordosis  Palpation/Tenderness:  left lumbar paraspinal tenderness  right lumbar paraspinal tenderness  Sensory:  no sensory deficits noted  Range of Motion:  Flexion:  Severely limited  with pain  Extension:  Severely limited  with pain  Lateral Flexion - Left:  Severely limited  with pain  Lateral Flexion - Right:  Severely limited  with pain  Rotation - Left:  Severely limited  with pain  Rotation - Right:  Severely limited  with pain   Lumbar facet loading is positive bilaterally    Motor Strength:  Left hip flexion:  5/5  Left hip extension:  5/5  Right hip flexion:  5/5  Right hip extension:  5/5  Left knee flexion:  5/5  Left knee extension:  5/5  Right knee flexion:  5/5  Right knee extension:  5/5  Left foot dorsiflexion:  5/5  Left foot plantar flexion:  5/5  Right foot dorsiflexion:  5/5  Right foot plantar flexion:  5/5  Reflexes:  Left Patellar:  2+   Right Patellar:  2+   Left Achilles:  2+   Right Achilles:  2+   Special Tests:  Left Straight Leg Test:  negative  Right Straight Leg Test:  negative  Left Rigo's Maneuver:  negative  Right Rigo's Maneuver:  negative    Imaging  XR spine lumbar complete w bending minimum 6 views    (Results Pending)       Orders Placed This Encounter   Procedures    XR spine lumbar complete w bending minimum 6 views

## 2018-03-29 ENCOUNTER — APPOINTMENT (OUTPATIENT)
Dept: RADIOLOGY | Facility: HOSPITAL | Age: 83
End: 2018-03-29
Payer: MEDICARE

## 2018-03-29 ENCOUNTER — TELEPHONE (OUTPATIENT)
Dept: PAIN MEDICINE | Facility: CLINIC | Age: 83
End: 2018-03-29

## 2018-03-29 ENCOUNTER — HOSPITAL ENCOUNTER (OUTPATIENT)
Facility: AMBULARY SURGERY CENTER | Age: 83
Setting detail: OUTPATIENT SURGERY
Discharge: HOME | End: 2018-03-29
Attending: ANESTHESIOLOGY | Admitting: ANESTHESIOLOGY
Payer: MEDICARE

## 2018-03-29 VITALS
TEMPERATURE: 98.3 F | SYSTOLIC BLOOD PRESSURE: 150 MMHG | RESPIRATION RATE: 18 BRPM | HEART RATE: 65 BPM | DIASTOLIC BLOOD PRESSURE: 70 MMHG | OXYGEN SATURATION: 95 %

## 2018-03-29 PROCEDURE — 64495 INJ PARAVERT F JNT L/S 3 LEV: CPT | Performed by: ANESTHESIOLOGY

## 2018-03-29 PROCEDURE — 72020 X-RAY EXAM OF SPINE 1 VIEW: CPT

## 2018-03-29 PROCEDURE — 64494 INJ PARAVERT F JNT L/S 2 LEV: CPT | Performed by: ANESTHESIOLOGY

## 2018-03-29 PROCEDURE — 64493 INJ PARAVERT F JNT L/S 1 LEV: CPT | Performed by: ANESTHESIOLOGY

## 2018-03-29 RX ORDER — LIDOCAINE WITH 8.4% SOD BICARB 0.9%(10ML)
SYRINGE (ML) INJECTION AS NEEDED
Status: DISCONTINUED | OUTPATIENT
Start: 2018-03-29 | End: 2018-03-29 | Stop reason: HOSPADM

## 2018-03-29 RX ORDER — LIDOCAINE HYDROCHLORIDE 10 MG/ML
INJECTION, SOLUTION EPIDURAL; INFILTRATION; INTRACAUDAL; PERINEURAL AS NEEDED
Status: DISCONTINUED | OUTPATIENT
Start: 2018-03-29 | End: 2018-03-29 | Stop reason: HOSPADM

## 2018-03-29 NOTE — TELEPHONE ENCOUNTER
Spoke to the patient and the patient's daughter regarding the impression fracture seen on the x-ray  I asked the patient and the patient's daughter have the PCP call back regarding the fracture  I think the patient would be a good candidate for Prolia therapy due to her compression fracture

## 2018-03-29 NOTE — DISCHARGE INSTRUCTIONS

## 2018-03-29 NOTE — TELEPHONE ENCOUNTER
Jennifer Baum from [facility] called to bring attention to pt's spine xray because of significant findings

## 2018-03-29 NOTE — OP NOTE
ATTENDING PHYSICIAN:  Jeanie Washington MD     PRE-PROCEDURE DIAGNOSIS:  Lumbar facet arthropathy  POST-PROCEDURE DIAGNOSIS:  Lumbar facet arthropathy  PROCEDURE:  Bilateral lumbar diagnostic medial branch blocks at the L3, L4, L5, and S1 levels  ESTIMATED BLOOD LOSS:  Minimal     COMPLICATIONS:  None  ANESTHESIA:  Local     LOCATION:  [address]  CONSENT: Today's procedure and its risks, benefits, as well as alternatives were explained to the patient  Risks include but are not limited to bleeding, infection, weakness, nerve irritation or damage, hematoma formation, abscess formation, failure the pain to improve, or potential worsening of the pain  The patient verbalized understanding and wished to proceed with the procedure  Written informed consent was thereby obtained  DESCRIPTION OF PROCEDURE: After written informed consent was obtained, the patient was taken to the fluoroscopy suite and placed in the prone position  Anatomical landmarks were identified with the aid of fluoroscopy in the PA and oblique views  The patient's lumbar region was prepped with antiseptic solution and draped in the usual sterile fashion  Strict aseptic technique was utilized  The skin and subcutaneous tissues at each needle entry site was infiltrated with a small amount of 1% preservative-free lidocaine using a 25-gauge 1-1/2-inch needle  A 25-gauge 3-1/2-inch needle was then incrementally advanced under fluoroscopic guidance in multiple views at each level such that the needle tip was advanced to contact os at the junction of the superior articulating process and the superomedial border of the transverse process at each of the cephalad levels as well as to contact os at the junction of the superior articulating process and the superomedial border of the sacral ala at the S1 level   After negative aspiration was confirmed, 0 5 mL of preservative-free 1% Lidocaine was injected into the cephalad needles and 1 mL of preservative-free 1% Lidocaine was injected into the needles at the S1 level  All needles were removed intact and hemostasis was maintained throughout  The patient tolerated the procedure well and no paresthesias or other complications were reported during or following this procedure  The antiseptic was wiped clean and Band-Aids were placed as appropriate  The patient was transferred to the recovery area and was observed for an appropriate period of time during which the patient remained hemodynamically stable and neurovascularly intact as the patient was prior to the procedure  The patient was subsequently discharged home in stable condition with supervision  The patient was instructed to call the office in a few days with an update  Discharge instructions were provided  I was present for and participated in all key and critical portions of this procedure      Genesis Phelps MD  3/29/2018  3:23 PM

## 2018-03-29 NOTE — INTERVAL H&P NOTE
H&P reviewed  After examining the patient I find no changes in the patients condition since the H&P had been written  We will proceed with a bilateral L3, L4, L5, and S1 medial branch blocks

## 2018-03-29 NOTE — H&P (VIEW-ONLY)
Assessment:  1  Chronic pain syndrome    2  Chronic bilateral low back pain without sciatica    3  Lumbar spondylosis        Plan:  Orders Placed This Encounter   Procedures    XR spine lumbar complete w bending minimum 6 views     Standing Status:   Future     Standing Expiration Date:   3/27/2022     Scheduling Instructions:      Bring along any outside films relating to this procedure  Order Specific Question:   Reason for Exam:     Answer:   low back pain     My impressions and treatment recommendations were discussed in detail with the patient, who verbalized understanding and had no further questions  Given that the patient has signs and symptoms consistent with bilateral lumbar facet syndrome and lumbar spondylosis, discussed the rationale of undergoing bilateral L3, L4, L5, and S1 diagnostic and confirmatory medial branch blocks  The procedures, its risks, and benefits were explained in detail to the patient  Risks include but are not limited to bleeding, infection, hematoma formation, abscess formation, weakness, headache, failure the pain to improve, nerve irritation or damage, and potential worsening of the pain  The patient verbalized understanding and wished to proceed with the procedure  I did feel reasonable to obtain an x-ray of the lumbar spine to evaluate for any other pathology that may be contributing to the patient's pain complaints  Follow-up is planned in 4 weeks time or sooner as warranted  Discharge instructions were provided  I personally saw and examined the patient and I agree with the above discussed plan of care  History of Present Illness:    Anne Marie Arceo is a 80 y o  female who presents to [facility] for initial evaluation of the above stated pain complaints  The patient has a past medical and chronic pain history as outlined in the assessment section  She was referred by Dr Ronald Corral      The patient reports a 5 year history of low back pain  She describes her pain as moderate to severe and 7/10 on the verbal numerical pain rating scale  Her pain is intermittent in nature and worse in the evening and night  She describes her pain as primarily in her low back and nonradiating in nature  She does report that her pain is in sharp and shooting    The patient reports weakness in her lower extremities  She reports no pain relieving factors, but does state that lying down, standing, bending, walking, and relaxation increases her pain  The patient does report that physical therapy, home exercises, and heat/ice treatment all provided moderate pain relief  She is currently using Tylenol over-the-counter as needed for pain relief  She does report that her pain is improved some of the time with the Tylenol  She is currently a long-term resident at a nursing home  Review of Systems:    Review of Systems   Constitutional: Negative for fever and unexpected weight change  HENT: Negative for trouble swallowing  Eyes: Negative for visual disturbance  Respiratory: Negative for shortness of breath and wheezing  Cardiovascular: Negative for chest pain and palpitations  Gastrointestinal: Negative for constipation, diarrhea, nausea and vomiting  Endocrine: Negative for cold intolerance, heat intolerance and polydipsia  Genitourinary: Positive for difficulty urinating  Negative for frequency  Musculoskeletal: Positive for gait problem (decreased range of motion)  Negative for arthralgias, joint swelling and myalgias  Skin: Negative for rash  Neurological: Negative for dizziness, seizures, syncope, weakness and headaches  Hematological: Does not bruise/bleed easily  Psychiatric/Behavioral: Negative for dysphoric mood  All other systems reviewed and are negative          Patient Active Problem List   Diagnosis    Shoulder dislocation    Dizziness    Atrial fibrillation ([address] )    Other benign neoplasm of skin of left upper limb, including shoulder    Chronic pain syndrome    Chronic bilateral low back pain without sciatica    Lumbar spondylosis       Past Medical History:   Diagnosis Date    Arthritis     Atrial fibrillation ([facility] [address]     Questionable    Dementia     Diabetes mellitus ([facility] [address] [facility]     Hypertension     Psychiatric disorder        Past Surgical History:   Procedure Laterality Date    CHOLECYSTECTOMY      FRACTURE SURGERY      left wrist , plates and pins, right ankle plate and pins    HYSTERECTOMY      WHIPPLE PROCEDURE W/ LAPAROSCOPY         Family History   Problem Relation Age of Onset    Family history unknown: Yes       Social History     Occupational History    Not on file  Social History Main Topics    Smoking status: Never Smoker    Smokeless tobacco: Not on file    Alcohol use No    Drug use: No    Sexual activity: Not Currently         Current Outpatient Prescriptions:     acetaminophen (TYLENOL) 325 mg tablet, Take 2 tablets (650 mg total) by mouth every 6 (six) hours as needed for mild pain , Disp: 30 tablet, Rfl: 0    ALPRAZolam (XANAX) 0 5 mg tablet, Take 0 5 mg by mouth daily at bedtime as needed for anxiety, Disp: , Rfl:     amiodarone 100 mg tablet, Take 100 mg by mouth daily, Disp: , Rfl:     amLODIPine (NORVASC) 5 mg tablet, Take 5 mg by mouth daily, Disp: , Rfl:     apixaban (ELIQUIS) 2 5 mg, Take 2 5 mg by mouth 2 (two) times a day, Disp: , Rfl:     cefadroxil (DURICEF) 500 mg capsule, Take 500 mg by mouth 2 (two) times a day, Disp: , Rfl:     donepezil (ARICEPT) 10 mg tablet, Take 10 mg by mouth daily at bedtime  , Disp: , Rfl:     DULoxetine (CYMBALTA) 60 mg delayed release capsule, Take 60 mg by mouth daily  , Disp: , Rfl:     memantine (NAMENDA) 10 mg tablet, Take 5 mg by mouth daily  , Disp: , Rfl:     metFORMIN (GLUCOPHAGE) 500 mg tablet, Take 500 mg by mouth daily with breakfast , Disp: , Rfl:     potassium chloride (K-DUR,KLOR-CON) 10 mEq tablet, Take 10 mEq by mouth 2 (two) times a day, Disp: , Rfl:     No Known Allergies    Physical Exam:    /62 (BP Location: Left arm, Patient Position: Sitting, Cuff Size: Standard)   Pulse 68   Temp 97 8 °F (36 6 °C) (Oral)   Resp 18   Ht 5' 1" (1 549 m)   Wt 69 9 kg (154 lb)   BMI 29 10 kg/m²     Constitutional: normal, well developed, well nourished, alert, in no distress and non-toxic and no overt pain behavior  Eyes: anicteric  HEENT: grossly intact  Neck: supple, symmetric, trachea midline and no masses   Pulmonary:even and unlabored  Cardiovascular:No edema or pitting edema present  Skin:Normal without rashes or lesions and well hydrated  Psychiatric:Mood and affect appropriate  Neurologic:Cranial Nerves II-XII grossly intact  Musculoskeletal:in wheelchair, the patient rises from a seated to a standing position with significant difficulty, push-off, and delay  Gait is significantly antalgic  Lumbar Spine Exam    Appearance:  Normal lordosis  Palpation/Tenderness:  left lumbar paraspinal tenderness  right lumbar paraspinal tenderness  Sensory:  no sensory deficits noted  Range of Motion:  Flexion:  Severely limited  with pain  Extension:  Severely limited  with pain  Lateral Flexion - Left:  Severely limited  with pain  Lateral Flexion - Right:  Severely limited  with pain  Rotation - Left:  Severely limited  with pain  Rotation - Right:  Severely limited  with pain   Lumbar facet loading is positive bilaterally    Motor Strength:  Left hip flexion:  5/5  Left hip extension:  5/5  Right hip flexion:  5/5  Right hip extension:  5/5  Left knee flexion:  5/5  Left knee extension:  5/5  Right knee flexion:  5/5  Right knee extension:  5/5  Left foot dorsiflexion:  5/5  Left foot plantar flexion:  5/5  Right foot dorsiflexion:  5/5  Right foot plantar flexion:  5/5  Reflexes:  Left Patellar:  2+   Right Patellar:  2+   Left Achilles:  2+   Right Achilles:  2+   Special Tests:  Left Straight Leg Test:  negative  Right Straight Leg Test:  negative  Left Rigo's Maneuver:  negative  Right Rigo's Maneuver:  negative    Imaging  XR spine lumbar complete w bending minimum 6 views    (Results Pending)       Orders Placed This Encounter   Procedures    XR spine lumbar complete w bending minimum 6 views

## 2018-03-30 ENCOUNTER — TELEPHONE (OUTPATIENT)
Dept: PAIN MEDICINE | Facility: CLINIC | Age: 83
End: 2018-03-30

## 2018-03-30 NOTE — TELEPHONE ENCOUNTER
Kayley Alatorre, pt's daughter called asking if pt's pain diary was received  Pt is in a nursing home and daughter wanted to make sure it was received  Please call [phone]

## 2018-03-30 NOTE — TELEPHONE ENCOUNTER
RN s/w pt's daughter Terrance Coleman and let her know that as of 3/30 AS has not yet seen the pain diary  Terrance [place] then asked about tx of compression fx's  RN advised that we usually try and keep pain controlled and have to give the body time to heal   Pt aware that at times AS would recommend a rheumatologist who may prescribe a form of calcium to help with the healing of the fx  Terrance Coleman appreciative of information and aware that this will be relayed to AS to review and add his recs or suggestions for same      ---Please advise on advise for pt's comp fx's--

## 2018-04-02 NOTE — TELEPHONE ENCOUNTER
S/w pt's daughter Eliud Lazar, explained AS's recommendations, she would like to have her mom proceed with an endocrinologist consult to see what the Prolia therapy could do for her  Eliud Lazar will stop by the [facility] office this afternoon to  the referral      Pt's daughter also wondering if AS thinks the patient would benefit from therapy or is it best to have the patient rest, b/c the patient with staff assist just transfers from the bed to the [facility] and that's all  Eliud Lazar wondering if we have received the MBB pain diary yet, I did not see it in the chart  She said she will go over to the place where her mom lives before she comes to the office today and bring a copy of it to drop off for AS to review  Please advise, thank you

## 2018-04-02 NOTE — TELEPHONE ENCOUNTER
Aware  Thanks  I typically refer to endocrinology to see if the patient qualifies for Prolia therapy to help heal the compression fractures  If the patient is interested, I can send her to endocrinology  Otherwise, compression fracture management is basically pain medicine and giving the body time to heal as you mentioned

## 2018-04-02 NOTE — TELEPHONE ENCOUNTER
Aware  Thanks  Endocrinology referral in [facility]  Physical therapy is an option for the patient, but I am not sure what the logistics are  Would the patient be able to come for outpatient physicial therapy or does the facility provide physical therapy? If her facility provides it, I would recommend she speak to the PCP about coordinating that   If she can come to outpatient PT several times a week, I can give her a referral

## 2018-04-03 NOTE — TELEPHONE ENCOUNTER
Spoke to pt's daughter Yossi Louder today and scheduled   B/L L3 L4 L5 S1 MBB #2 ([id number]) for 4/26/18  Went over pre-procedure instructions below:    No as needed pain meds for 6 hrs prior to and 6 hrs after procedure    Pain level must be at least a 5 or greater that day  Nothing to eat or drink 1 hr prior to procedure  Need to arrange transportation  Proper clothing for procedure  If ill or on antibiotics please call to reschedule

## 2018-04-26 ENCOUNTER — FOLLOW UP (OUTPATIENT)
Dept: URBAN - METROPOLITAN AREA CLINIC 27 | Facility: CLINIC | Age: 83
End: 2018-04-26

## 2018-04-26 DIAGNOSIS — H35.61: ICD-10-CM

## 2018-04-26 DIAGNOSIS — H35.3231: ICD-10-CM

## 2018-04-26 PROCEDURE — 92014 COMPRE OPH EXAM EST PT 1/>: CPT | Mod: 25

## 2018-04-26 PROCEDURE — 92134 CPTRZ OPH DX IMG PST SGM RTA: CPT

## 2018-04-26 PROCEDURE — 67028 INJECTION EYE DRUG: CPT

## 2018-04-26 ASSESSMENT — TONOMETRY
OS_IOP_MMHG: 19
OD_IOP_MMHG: 18

## 2018-04-26 ASSESSMENT — VISUAL ACUITY
OD_SC: CF 1FT
OS_SC: 20/40-

## 2018-05-02 ENCOUNTER — HOSPITAL ENCOUNTER (OUTPATIENT)
Dept: RADIOLOGY | Facility: HOSPITAL | Age: 83
Setting detail: OUTPATIENT SURGERY
Discharge: HOME | End: 2018-05-02
Payer: MEDICARE

## 2018-05-02 ENCOUNTER — HOSPITAL ENCOUNTER (OUTPATIENT)
Facility: AMBULARY SURGERY CENTER | Age: 83
Setting detail: OUTPATIENT SURGERY
Discharge: HOME | End: 2018-05-02
Attending: ANESTHESIOLOGY | Admitting: ANESTHESIOLOGY
Payer: MEDICARE

## 2018-05-02 VITALS
DIASTOLIC BLOOD PRESSURE: 70 MMHG | RESPIRATION RATE: 18 BRPM | HEART RATE: 75 BPM | SYSTOLIC BLOOD PRESSURE: 142 MMHG | OXYGEN SATURATION: 97 % | TEMPERATURE: 97 F

## 2018-05-02 LAB — GLUCOSE SERPL-MCNC: 110 MG/DL (ref 65–140)

## 2018-05-02 PROCEDURE — 82948 REAGENT STRIP/BLOOD GLUCOSE: CPT

## 2018-05-02 PROCEDURE — 64495 INJ PARAVERT F JNT L/S 3 LEV: CPT | Performed by: ANESTHESIOLOGY

## 2018-05-02 PROCEDURE — 72020 X-RAY EXAM OF SPINE 1 VIEW: CPT

## 2018-05-02 PROCEDURE — 64493 INJ PARAVERT F JNT L/S 1 LEV: CPT | Performed by: ANESTHESIOLOGY

## 2018-05-02 PROCEDURE — 64494 INJ PARAVERT F JNT L/S 2 LEV: CPT | Performed by: ANESTHESIOLOGY

## 2018-05-02 RX ORDER — BUPIVACAINE HYDROCHLORIDE 2.5 MG/ML
INJECTION, SOLUTION EPIDURAL; INFILTRATION; INTRACAUDAL AS NEEDED
Status: DISCONTINUED | OUTPATIENT
Start: 2018-05-02 | End: 2018-05-02 | Stop reason: HOSPADM

## 2018-05-02 RX ORDER — LIDOCAINE WITH 8.4% SOD BICARB 0.9%(10ML)
SYRINGE (ML) INJECTION AS NEEDED
Status: DISCONTINUED | OUTPATIENT
Start: 2018-05-02 | End: 2018-05-02 | Stop reason: HOSPADM

## 2018-05-02 NOTE — H&P
History of Present Illness: The patient is a 80 y o  female who presents with complaints of low back pain  Patient Active Problem List   Diagnosis    Shoulder dislocation    Dizziness    Atrial fibrillation (HCC)    Other benign neoplasm of skin of left upper limb, including shoulder    Chronic pain syndrome    Chronic bilateral low back pain without sciatica    Lumbar spondylosis    Low back pain       Past Medical History:   Diagnosis Date    Arthritis     Atrial fibrillation ([facility] [address] [facility]     Questionable    Dementia     Diabetes mellitus ([facility] [address] [facility]     Hypertension     Psychiatric disorder        Past Surgical History:   Procedure Laterality Date    CATARACT EXTRACTION      bilateral    CHOLECYSTECTOMY      FRACTURE SURGERY      left wrist , plates and pins, right ankle plate and pins    HYSTERECTOMY      NERVE BLOCK Bilateral 3/29/2018    Procedure: B/L L3 L4 L5 S1 MBB #1 ([id number]); Surgeon: Silvia Benton MD;  [facility] MAIN OR;  Service: Pain Management     TONSILLECTOMY      WHIPPLE PROCEDURE W/ LAPAROSCOPY         No current facility-administered medications for this encounter  No Known Allergies    Physical Exam: There were no vitals filed for this visit  General: Awake, Alert, Oriented x 3, Mood and affect appropriate  Respiratory: Respirations even and unlabored  Cardiovascular: Peripheral pulses intact; no edema  Musculoskeletal Exam:  Lumbar facet loading is positive    ASA Score: 2    Assessment:  Bilateral lumbar facet syndrome    Plan:  Proceed with lumbar medial branch block

## 2018-05-02 NOTE — DISCHARGE INSTRUCTIONS

## 2018-05-02 NOTE — OP NOTE
ATTENDING PHYSICIAN:  Nirav Thompson MD     PRE-PROCEDURE DIAGNOSIS:  Lumbar facet arthropathy  POST-PROCEDURE DIAGNOSIS:  Lumbar facet arthropathy  PROCEDURE:  Bilateral lumbar diagnostic medial branch blocks at the L3, L4, L5, and S1 levels  ESTIMATED BLOOD LOSS:  Minimal     COMPLICATIONS:  None  ANESTHESIA:  Local     LOCATION:  [facility]  CONSENT: Today's procedure and its risks, benefits, as well as alternatives were explained to the patient  Risks include but are not limited to bleeding, infection, weakness, nerve irritation or damage, hematoma formation, abscess formation, failure the pain to improve, or potential worsening of the pain  The patient verbalized understanding and wished to proceed with the procedure  Written informed consent was thereby obtained  DESCRIPTION OF PROCEDURE: After written informed consent was obtained, the patient was taken to the fluoroscopy suite and placed in the prone position  Anatomical landmarks were identified with the aid of fluoroscopy in the PA and oblique views  The patient's lumbar region was prepped with antiseptic solution and draped in the usual sterile fashion  Strict aseptic technique was utilized  The skin and subcutaneous tissues at each needle entry site was infiltrated with a small amount of 1% preservative-free lidocaine using a 25-gauge 1-1/2-inch needle  A 25-gauge 3-1/2-inch needle was then incrementally advanced under fluoroscopic guidance in multiple views at each level such that the needle tip was advanced to contact os at the junction of the superior articulating process and the superomedial border of the transverse process at each of the cephalad levels as well as to contact os at the junction of the superior articulating process and the superomedial border of the sacral ala at the S1 level   After negative aspiration was confirmed, 0 5 mL of preservative-free 0 25% Bupivicaine was injected into the cephalad needles and 1 mL of preservative-free 0 25% Bupivicaine was injected into the needles at the S1 level  All needles were removed intact and hemostasis was maintained throughout  The patient tolerated the procedure well and no paresthesias or other complications were reported during or following this procedure  The antiseptic was wiped clean and Band-Aids were placed as appropriate  The patient was transferred to the recovery area and was observed for an appropriate period of time during which the patient remained hemodynamically stable and neurovascularly intact as the patient was prior to the procedure  The patient was subsequently discharged home in stable condition with supervision  The patient was instructed to call the office in a few days with an update  Discharge instructions were provided  I was present for and participated in all key and critical portions of this procedure      Stephane Park MD  5/2/2018  1:42 PM

## 2018-05-04 ENCOUNTER — TELEPHONE (OUTPATIENT)
Dept: PAIN MEDICINE | Facility: CLINIC | Age: 83
End: 2018-05-04

## 2018-05-04 NOTE — TELEPHONE ENCOUNTER
----- Message from Memo Gauthier MD sent at 5/4/2018  7:42 AM EDT -----  Regarding: MBB Diary  MBB diary received and reviewed  Please schedule MBB #2

## 2018-05-04 NOTE — TELEPHONE ENCOUNTER
Scheduled pt for Left L3 L4 L5 S1 RFA on 5/18/18 and Right L3 L4 L5 S1 RFA on 6/8/18 ([id number])  Went over pre-procedure instructions below:    Nothing to eat or drink 1 hr prior to procedure  Need to arrange transportation  Proper clothing for procedure  If ill or on antibiotics please call to reschedule

## 2018-05-18 ENCOUNTER — HOSPITAL ENCOUNTER (OUTPATIENT)
Dept: RADIOLOGY | Facility: HOSPITAL | Age: 83
Setting detail: OUTPATIENT SURGERY
Discharge: HOME | End: 2018-05-18
Payer: MEDICARE

## 2018-05-18 ENCOUNTER — HOSPITAL ENCOUNTER (OUTPATIENT)
Facility: AMBULARY SURGERY CENTER | Age: 83
Setting detail: OUTPATIENT SURGERY
Discharge: HOME | End: 2018-05-18
Attending: ANESTHESIOLOGY | Admitting: ANESTHESIOLOGY
Payer: MEDICARE

## 2018-05-18 VITALS
HEART RATE: 65 BPM | SYSTOLIC BLOOD PRESSURE: 145 MMHG | DIASTOLIC BLOOD PRESSURE: 64 MMHG | RESPIRATION RATE: 18 BRPM | TEMPERATURE: 98.7 F | OXYGEN SATURATION: 97 %

## 2018-05-18 DIAGNOSIS — Z92.241 S/P EPIDURAL STEROID INJECTION: ICD-10-CM

## 2018-05-18 PROCEDURE — 64636 DESTROY L/S FACET JNT ADDL: CPT | Performed by: ANESTHESIOLOGY

## 2018-05-18 PROCEDURE — 82948 REAGENT STRIP/BLOOD GLUCOSE: CPT

## 2018-05-18 PROCEDURE — 72020 X-RAY EXAM OF SPINE 1 VIEW: CPT

## 2018-05-18 PROCEDURE — 64635 DESTROY LUMB/SAC FACET JNT: CPT | Performed by: ANESTHESIOLOGY

## 2018-05-18 RX ORDER — LIDOCAINE WITH 8.4% SOD BICARB 0.9%(10ML)
SYRINGE (ML) INJECTION AS NEEDED
Status: DISCONTINUED | OUTPATIENT
Start: 2018-05-18 | End: 2018-05-18 | Stop reason: HOSPADM

## 2018-05-18 RX ORDER — LIDOCAINE HYDROCHLORIDE 20 MG/ML
INJECTION, SOLUTION EPIDURAL; INFILTRATION; INTRACAUDAL; PERINEURAL AS NEEDED
Status: DISCONTINUED | OUTPATIENT
Start: 2018-05-18 | End: 2018-05-18 | Stop reason: HOSPADM

## 2018-05-18 NOTE — H&P (VIEW-ONLY)
History of Present Illness: The patient is a 80 y o  female who presents with complaints of low back pain  Patient Active Problem List   Diagnosis    Shoulder dislocation    Dizziness    Atrial fibrillation (HCC)    Other benign neoplasm of skin of left upper limb, including shoulder    Chronic pain syndrome    Chronic bilateral low back pain without sciatica    Lumbar spondylosis    Low back pain       Past Medical History:   Diagnosis Date    Arthritis     At[facility] fibrillation ([facility] [address] [facility]     Questionable    Dementia     Diabetes mellitus ([facility] [address] [facility]     Hypertension     Psychiatric disorder        Past Surgical History:   Procedure Laterality Date    CATARACT EXTRACTION      bilateral    CHOLECYSTECTOMY      FRACTURE SURGERY      left wrist , plates and pins, right ankle plate and pins    HYSTERECTOMY      NERVE BLOCK Bilateral 3/29/2018    Procedure: B/L L3 L4 L5 S1 MBB #1 ([id number]); Surgeon: Vahe Gonzales MD;  Location: [facility] MAIN OR;  Service: Pain Management     TONSILLECTOMY      WHIPPLE PROCEDURE W/ LAPAROSCOPY         No current facility-administered medications for this encounter  No Known Allergies    Physical Exam: There were no vitals filed for this visit  General: Awake, Alert, Oriented x 3, Mood and affect appropriate  Respiratory: Respirations even and unlabored  Cardiovascular: Peripheral pulses intact; no edema  Musculoskeletal Exam:  Lumbar facet loading is positive    ASA Score: 2    Assessment:  Bilateral lumbar facet syndrome    Plan:  Proceed with lumbar medial branch block

## 2018-05-18 NOTE — DISCHARGE INSTRUCTIONS

## 2018-05-18 NOTE — OP NOTE
ATTENDING PHYSICIAN:   Haven Hastings MD      PREPROCEDURE DIAGNOSIS:  Left lumbar facet arthropathy  POSTPROCEDURE DIAGNOSIS:  Left lumbar facet arthropathy  PROCEDURE: Left L3, L4, L5, and S1 lumbar facet nerve radiofrequency ablation under fluoroscopic guidance  ANESTHESIA:  Local     ESTIMATED BLOOD LOSS:  Minimal     COMPLICATIONS:  None  LOCATION:   [place] [address], [facility]  HISTORY OF PRESENTING ILLNESS:   We feel radiofrequency denervation of the L3, L4, L5, and S1 facets is medically necessary, given the patient has disabling low back pain, which we suspect is facet mediated in the absence of nerve root compression or radicular pain  The patient has had two positive confirmatory diagnostic medial branch blocks  The patient has also failed three months of conservative therapy, including nonopioid medications, manipulation, physical therapy and home exercise program   The patient has not been treated with radiofrequency denervation at this anatomic location within the past six months  CONSENT:  Today's procedure, its potential benefits as well as its risks and potential side effects were reviewed  Discussed risks of the procedure including bleeding, infection, nerve irritation or damage, reactions to the medications, failure of the pain to improve, and potential worsening of the pain were explained in detail to the patient who verbalized understanding and who wished to proceed  Written informed consent was thereby obtained  DESCRIPTION OF THE PROCEDURE: After written informed consent was obtained, the patient was taken to the fluoroscopy suite and placed in the prone position  Anatomical landmarks were identified by way of palpation with fluoroscopy in the PA and oblique views  The patient's lumbar region was then prepped and draped in the usual sterile fashion using Chlorhexidine    The skin and subcutaneous tissues were subsequently infiltrated with approximately 1 ml of 1% preservative free Lidocaine at each of the four intended needle entry sites using a 25 gauge 1-1/2 inch needle  Via fluoroscopy in the AP and oblique views, an active tip needle was then incrementally advanced under fluoroscopic guidance at each level  At each of these levels, the needle tip was made to contact os at the superior medial border of the junction of the transverse process of the lumbar levels and to contact the os at the medial aspect of the groove formed by the sacral ala in the superior articular process of S1  After proper needle placement was confirmed with fluoroscopic guidance at each level, sensory and motor stimulation was performed at 2 Hz and 50 Hz  At all levels, there was negative extension into the lower extremities  Following this portion of the procedure, a 1 ml injectate of 2% preservative free Lidocaine was instilled at all of the levels  After a period of approximately 90 seconds, each level was lesioned at 90 degrees Celsius  Following the initial lesioning, each needle tip was repositioned x 2 under fluoroscopic guidance in a clockwise and counterclockwise fashion  Following each reposition, a total of two additional lesions at each side were performed for 90 seconds at 90 degrees Celsius  All needles were removed with the tips intact  The patient tolerated the procedure and hemostasis was maintained  There were no apparent paresthesias or complications  This skin was wiped clean and a Band-Aid was placed as appropriate  The patient was monitored for an appropriate period of time following the procedure and remained hemodynamically stable and neurovascularly intact  The patient was ultimately discharged to home with supervision in good condition and instructed to call the office to report the response  I was present for and participated in all key and critical portions of this procedure      Vahe Gonzales MD  5/18/2018  12:49 PM

## 2018-05-21 ENCOUNTER — TELEPHONE (OUTPATIENT)
Dept: PAIN MEDICINE | Facility: CLINIC | Age: 83
End: 2018-05-21

## 2018-05-21 NOTE — TELEPHONE ENCOUNTER
Patient is in nursing home  S/W daughter Rae Greenfield), states that her mother is doing well  Patient did take some advil for pain relief  Pt did not use any ice to the area  Ramone Jolly states that her mother did not report having any fever, redness, drainage, s/s of infection, stiffness of neck, headache or sunburning sensation  Confirmed appt on 6/8/18

## 2018-05-21 NOTE — TELEPHONE ENCOUNTER
To be called on 5/21/18    S/P  Left L3,L4,L5,S1 RFA with AS on 5/18/18  Pt scheduled for Right L3,L4,L5,S1 with AS on 6/8/18

## 2018-05-29 LAB — GLUCOSE SERPL-MCNC: 131 MG/DL (ref 65–140)

## 2018-06-06 ENCOUNTER — TELEPHONE (OUTPATIENT)
Dept: PAIN MEDICINE | Facility: CLINIC | Age: 83
End: 2018-06-06

## 2018-06-06 NOTE — TELEPHONE ENCOUNTER
Pt's daughter Wing Aiken left  6/6/18 @ 12:25PM   States that she needs to cancel her mothers appt for an inj on 6/8

## 2018-09-06 ENCOUNTER — FOLLOW UP (OUTPATIENT)
Dept: URBAN - METROPOLITAN AREA CLINIC 27 | Facility: CLINIC | Age: 83
End: 2018-09-06

## 2018-09-06 DIAGNOSIS — H35.3231: ICD-10-CM

## 2018-09-06 DIAGNOSIS — H35.61: ICD-10-CM

## 2018-09-06 PROCEDURE — 67028 INJECTION EYE DRUG: CPT

## 2018-09-06 PROCEDURE — 92014 COMPRE OPH EXAM EST PT 1/>: CPT | Mod: 25

## 2018-09-06 PROCEDURE — 92134 CPTRZ OPH DX IMG PST SGM RTA: CPT

## 2018-09-06 ASSESSMENT — TONOMETRY
OD_IOP_MMHG: 17
OS_IOP_MMHG: 21

## 2018-09-06 ASSESSMENT — VISUAL ACUITY: OS_SC: 20/50

## 2018-11-01 ENCOUNTER — FOLLOW UP (OUTPATIENT)
Dept: URBAN - METROPOLITAN AREA CLINIC 27 | Facility: CLINIC | Age: 83
End: 2018-11-01

## 2018-11-01 DIAGNOSIS — H35.3231: ICD-10-CM

## 2018-11-01 DIAGNOSIS — H35.61: ICD-10-CM

## 2018-11-01 PROCEDURE — 92012 INTRM OPH EXAM EST PATIENT: CPT | Mod: 25

## 2018-11-01 PROCEDURE — 67028 INJECTION EYE DRUG: CPT

## 2018-11-01 PROCEDURE — 92134 CPTRZ OPH DX IMG PST SGM RTA: CPT

## 2018-11-01 ASSESSMENT — VISUAL ACUITY: OS_SC: 20/40-

## 2018-11-01 ASSESSMENT — TONOMETRY
OD_IOP_MMHG: 17
OS_IOP_MMHG: 19

## 2018-12-20 ENCOUNTER — FOLLOW UP (OUTPATIENT)
Dept: URBAN - METROPOLITAN AREA CLINIC 27 | Facility: CLINIC | Age: 83
End: 2018-12-20

## 2018-12-20 DIAGNOSIS — H35.61: ICD-10-CM

## 2018-12-20 DIAGNOSIS — H35.3231: ICD-10-CM

## 2018-12-20 PROCEDURE — 92134 CPTRZ OPH DX IMG PST SGM RTA: CPT

## 2018-12-20 PROCEDURE — 67028 INJECTION EYE DRUG: CPT

## 2018-12-20 PROCEDURE — 92012 INTRM OPH EXAM EST PATIENT: CPT | Mod: 25

## 2018-12-20 ASSESSMENT — TONOMETRY: OS_IOP_MMHG: 21

## 2018-12-20 ASSESSMENT — VISUAL ACUITY: OS_SC: 20/40

## 2019-05-09 ENCOUNTER — FOLLOW UP (OUTPATIENT)
Dept: URBAN - METROPOLITAN AREA CLINIC 27 | Facility: CLINIC | Age: 84
End: 2019-05-09

## 2019-05-09 DIAGNOSIS — H35.61: ICD-10-CM

## 2019-05-09 DIAGNOSIS — H35.3231: ICD-10-CM

## 2019-05-09 PROCEDURE — 92014 COMPRE OPH EXAM EST PT 1/>: CPT | Mod: 25

## 2019-05-09 PROCEDURE — 92134 CPTRZ OPH DX IMG PST SGM RTA: CPT

## 2019-05-09 PROCEDURE — 67028 INJECTION EYE DRUG: CPT

## 2019-05-09 ASSESSMENT — VISUAL ACUITY
OS_CC: 20/50
OD_CC: CF 1FT

## 2019-05-09 ASSESSMENT — TONOMETRY
OS_IOP_MMHG: 23
OD_IOP_MMHG: 20

## 2019-06-26 ENCOUNTER — TRANSCRIBE ORDERS (OUTPATIENT)
Dept: ADMINISTRATIVE | Facility: HOSPITAL | Age: 84
End: 2019-06-26

## 2019-06-26 DIAGNOSIS — F02.80 LATE ONSET ALZHEIMER'S DISEASE WITHOUT BEHAVIORAL DISTURBANCE (HCC): Primary | ICD-10-CM

## 2019-06-26 DIAGNOSIS — G30.1 LATE ONSET ALZHEIMER'S DISEASE WITHOUT BEHAVIORAL DISTURBANCE (HCC): Primary | ICD-10-CM

## 2019-09-12 ENCOUNTER — FOLLOW UP (OUTPATIENT)
Dept: URBAN - METROPOLITAN AREA CLINIC 27 | Facility: CLINIC | Age: 84
End: 2019-09-12

## 2019-09-12 DIAGNOSIS — H35.3231: ICD-10-CM

## 2019-09-12 DIAGNOSIS — H35.61: ICD-10-CM

## 2019-09-12 PROCEDURE — 92014 COMPRE OPH EXAM EST PT 1/>: CPT | Mod: 25

## 2019-09-12 PROCEDURE — 92134 CPTRZ OPH DX IMG PST SGM RTA: CPT

## 2019-09-12 PROCEDURE — 67028 INJECTION EYE DRUG: CPT

## 2019-09-12 ASSESSMENT — TONOMETRY
OS_IOP_MMHG: 14
OD_IOP_MMHG: 14

## 2019-09-12 ASSESSMENT — VISUAL ACUITY
OS_CC: 20/60+
OD_CC: CF 2FT

## 2020-07-16 ENCOUNTER — FOLLOW UP (OUTPATIENT)
Dept: URBAN - METROPOLITAN AREA CLINIC 27 | Facility: CLINIC | Age: 85
End: 2020-07-16

## 2020-07-16 DIAGNOSIS — H35.61: ICD-10-CM

## 2020-07-16 DIAGNOSIS — H43.813: ICD-10-CM

## 2020-07-16 DIAGNOSIS — H35.3231: ICD-10-CM

## 2020-07-16 DIAGNOSIS — H33.302: ICD-10-CM

## 2020-07-16 PROCEDURE — 92134 CPTRZ OPH DX IMG PST SGM RTA: CPT

## 2020-07-16 PROCEDURE — 92014 COMPRE OPH EXAM EST PT 1/>: CPT

## 2020-07-16 ASSESSMENT — TONOMETRY
OD_IOP_MMHG: 17
OS_IOP_MMHG: 19

## 2020-07-16 ASSESSMENT — VISUAL ACUITY
OS_CC: 20/125
OD_CC: CF 1FT

## 2021-02-02 ENCOUNTER — APPOINTMENT (EMERGENCY)
Dept: RADIOLOGY | Facility: HOSPITAL | Age: 86
DRG: 300 | End: 2021-02-02
Attending: EMERGENCY MEDICINE
Payer: MEDICARE

## 2021-02-02 ENCOUNTER — APPOINTMENT (INPATIENT)
Dept: RADIOLOGY | Facility: HOSPITAL | Age: 86
DRG: 300 | End: 2021-02-02
Payer: MEDICARE

## 2021-02-02 ENCOUNTER — HOSPITAL ENCOUNTER (INPATIENT)
Facility: HOSPITAL | Age: 86
LOS: 3 days | Discharge: NURSING, REHAB OR LONG-TERM CARE | DRG: 300 | End: 2021-02-05
Attending: EMERGENCY MEDICINE | Admitting: SPECIALIST
Payer: MEDICARE

## 2021-02-02 DIAGNOSIS — I99.8 ISCHEMIA OF RIGHT LOWER EXTREMITY: Primary | ICD-10-CM

## 2021-02-02 DIAGNOSIS — I48.20 CHRONIC ATRIAL FIBRILLATION (HCC): ICD-10-CM

## 2021-02-02 DIAGNOSIS — M62.261: ICD-10-CM

## 2021-02-02 LAB
ABO GROUP BLD: NORMAL
ALBUMIN SERPL BCP-MCNC: 2.3 G/DL (ref 3.5–5)
ALP SERPL-CCNC: 103 U/L (ref 46–116)
ALT SERPL W P-5'-P-CCNC: 25 U/L (ref 12–78)
ANION GAP SERPL CALCULATED.3IONS-SCNC: 10 MMOL/L (ref 4–13)
APTT PPP: 27 SECONDS (ref 23–37)
APTT PPP: 87 SECONDS (ref 23–37)
AST SERPL W P-5'-P-CCNC: 56 U/L (ref 5–45)
BASOPHILS # BLD AUTO: 0.03 THOUSANDS/ΜL (ref 0–0.1)
BASOPHILS NFR BLD AUTO: 0 % (ref 0–1)
BILIRUB SERPL-MCNC: 0.3 MG/DL (ref 0.2–1)
BLD GP AB SCN SERPL QL: NEGATIVE
BUN SERPL-MCNC: 8 MG/DL (ref 5–25)
CALCIUM ALBUM COR SERPL-MCNC: 9.9 MG/DL (ref 8.3–10.1)
CALCIUM SERPL-MCNC: 8.5 MG/DL (ref 8.3–10.1)
CHLORIDE SERPL-SCNC: 107 MMOL/L (ref 100–108)
CO2 SERPL-SCNC: 26 MMOL/L (ref 21–32)
CREAT SERPL-MCNC: 0.93 MG/DL (ref 0.6–1.3)
EOSINOPHIL # BLD AUTO: 0.08 THOUSAND/ΜL (ref 0–0.61)
EOSINOPHIL NFR BLD AUTO: 1 % (ref 0–6)
ERYTHROCYTE [DISTWIDTH] IN BLOOD BY AUTOMATED COUNT: 15.8 % (ref 11.6–15.1)
GFR SERPL CREATININE-BSD FRML MDRD: 54 ML/MIN/1.73SQ M
GLUCOSE SERPL-MCNC: 143 MG/DL (ref 65–140)
GLUCOSE SERPL-MCNC: 150 MG/DL (ref 65–140)
HCT VFR BLD AUTO: 50.3 % (ref 34.8–46.1)
HGB BLD-MCNC: 15.7 G/DL (ref 11.5–15.4)
IMM GRANULOCYTES # BLD AUTO: 0.06 THOUSAND/UL (ref 0–0.2)
IMM GRANULOCYTES NFR BLD AUTO: 1 % (ref 0–2)
INR PPP: 1.06 (ref 0.84–1.19)
LACTATE SERPL-SCNC: 2 MMOL/L (ref 0.5–2)
LYMPHOCYTES # BLD AUTO: 1.06 THOUSANDS/ΜL (ref 0.6–4.47)
LYMPHOCYTES NFR BLD AUTO: 9 % (ref 14–44)
MCH RBC QN AUTO: 29.2 PG (ref 26.8–34.3)
MCHC RBC AUTO-ENTMCNC: 31.2 G/DL (ref 31.4–37.4)
MCV RBC AUTO: 94 FL (ref 82–98)
MONOCYTES # BLD AUTO: 0.65 THOUSAND/ΜL (ref 0.17–1.22)
MONOCYTES NFR BLD AUTO: 6 % (ref 4–12)
NEUTROPHILS # BLD AUTO: 9.83 THOUSANDS/ΜL (ref 1.85–7.62)
NEUTS SEG NFR BLD AUTO: 83 % (ref 43–75)
NRBC BLD AUTO-RTO: 0 /100 WBCS
PLATELET # BLD AUTO: 270 THOUSANDS/UL (ref 149–390)
PMV BLD AUTO: 10.4 FL (ref 8.9–12.7)
POTASSIUM SERPL-SCNC: 4 MMOL/L (ref 3.5–5.3)
PROT SERPL-MCNC: 5.9 G/DL (ref 6.4–8.2)
PROTHROMBIN TIME: 13.7 SECONDS (ref 11.6–14.5)
RBC # BLD AUTO: 5.37 MILLION/UL (ref 3.81–5.12)
RH BLD: POSITIVE
SODIUM SERPL-SCNC: 143 MMOL/L (ref 136–145)
SPECIMEN EXPIRATION DATE: NORMAL
WBC # BLD AUTO: 11.71 THOUSAND/UL (ref 4.31–10.16)

## 2021-02-02 PROCEDURE — 36415 COLL VENOUS BLD VENIPUNCTURE: CPT | Performed by: EMERGENCY MEDICINE

## 2021-02-02 PROCEDURE — 86901 BLOOD TYPING SEROLOGIC RH(D): CPT | Performed by: EMERGENCY MEDICINE

## 2021-02-02 PROCEDURE — 99285 EMERGENCY DEPT VISIT HI MDM: CPT

## 2021-02-02 PROCEDURE — 80053 COMPREHEN METABOLIC PANEL: CPT | Performed by: EMERGENCY MEDICINE

## 2021-02-02 PROCEDURE — G1004 CDSM NDSC: HCPCS

## 2021-02-02 PROCEDURE — 75635 CT ANGIO ABDOMINAL ARTERIES: CPT

## 2021-02-02 PROCEDURE — 99285 EMERGENCY DEPT VISIT HI MDM: CPT | Performed by: EMERGENCY MEDICINE

## 2021-02-02 PROCEDURE — 85610 PROTHROMBIN TIME: CPT | Performed by: EMERGENCY MEDICINE

## 2021-02-02 PROCEDURE — 82948 REAGENT STRIP/BLOOD GLUCOSE: CPT

## 2021-02-02 PROCEDURE — 86850 RBC ANTIBODY SCREEN: CPT | Performed by: EMERGENCY MEDICINE

## 2021-02-02 PROCEDURE — 1123F ACP DISCUSS/DSCN MKR DOCD: CPT | Performed by: EMERGENCY MEDICINE

## 2021-02-02 PROCEDURE — 99223 1ST HOSP IP/OBS HIGH 75: CPT | Performed by: PHYSICIAN ASSISTANT

## 2021-02-02 PROCEDURE — 83605 ASSAY OF LACTIC ACID: CPT | Performed by: EMERGENCY MEDICINE

## 2021-02-02 PROCEDURE — 93926 LOWER EXTREMITY STUDY: CPT

## 2021-02-02 PROCEDURE — 96375 TX/PRO/DX INJ NEW DRUG ADDON: CPT

## 2021-02-02 PROCEDURE — 85730 THROMBOPLASTIN TIME PARTIAL: CPT | Performed by: EMERGENCY MEDICINE

## 2021-02-02 PROCEDURE — NC001 PR NO CHARGE: Performed by: SURGERY

## 2021-02-02 PROCEDURE — 86900 BLOOD TYPING SEROLOGIC ABO: CPT | Performed by: EMERGENCY MEDICINE

## 2021-02-02 PROCEDURE — 96365 THER/PROPH/DIAG IV INF INIT: CPT

## 2021-02-02 PROCEDURE — 93005 ELECTROCARDIOGRAM TRACING: CPT

## 2021-02-02 PROCEDURE — 85025 COMPLETE CBC W/AUTO DIFF WBC: CPT | Performed by: EMERGENCY MEDICINE

## 2021-02-02 PROCEDURE — 85730 THROMBOPLASTIN TIME PARTIAL: CPT | Performed by: SPECIALIST

## 2021-02-02 RX ORDER — ALPRAZOLAM 0.5 MG/1
0.5 TABLET ORAL
Status: DISCONTINUED | OUTPATIENT
Start: 2021-02-02 | End: 2021-02-05 | Stop reason: HOSPADM

## 2021-02-02 RX ORDER — ACETAMINOPHEN 325 MG/1
650 TABLET ORAL EVERY 6 HOURS PRN
Status: DISCONTINUED | OUTPATIENT
Start: 2021-02-02 | End: 2021-02-05 | Stop reason: HOSPADM

## 2021-02-02 RX ORDER — DONEPEZIL HYDROCHLORIDE 5 MG/1
10 TABLET, FILM COATED ORAL
Status: DISCONTINUED | OUTPATIENT
Start: 2021-02-02 | End: 2021-02-05 | Stop reason: HOSPADM

## 2021-02-02 RX ORDER — HEPARIN SODIUM 1000 [USP'U]/ML
4000 INJECTION, SOLUTION INTRAVENOUS; SUBCUTANEOUS ONCE
Status: COMPLETED | OUTPATIENT
Start: 2021-02-02 | End: 2021-02-02

## 2021-02-02 RX ORDER — HEPARIN SODIUM 10000 [USP'U]/100ML
3-30 INJECTION, SOLUTION INTRAVENOUS
Status: DISCONTINUED | OUTPATIENT
Start: 2021-02-02 | End: 2021-02-04

## 2021-02-02 RX ORDER — HEPARIN SODIUM 1000 [USP'U]/ML
4000 INJECTION, SOLUTION INTRAVENOUS; SUBCUTANEOUS
Status: DISCONTINUED | OUTPATIENT
Start: 2021-02-02 | End: 2021-02-04

## 2021-02-02 RX ORDER — ACETAMINOPHEN 325 MG/1
975 TABLET ORAL ONCE
Status: COMPLETED | OUTPATIENT
Start: 2021-02-02 | End: 2021-02-02

## 2021-02-02 RX ORDER — FENTANYL CITRATE 50 UG/ML
25 INJECTION, SOLUTION INTRAMUSCULAR; INTRAVENOUS ONCE
Status: COMPLETED | OUTPATIENT
Start: 2021-02-02 | End: 2021-02-02

## 2021-02-02 RX ORDER — OXYCODONE HYDROCHLORIDE 5 MG/1
2.5 TABLET ORAL EVERY 4 HOURS PRN
Status: DISCONTINUED | OUTPATIENT
Start: 2021-02-02 | End: 2021-02-05 | Stop reason: HOSPADM

## 2021-02-02 RX ORDER — DULOXETIN HYDROCHLORIDE 60 MG/1
60 CAPSULE, DELAYED RELEASE ORAL DAILY
Status: DISCONTINUED | OUTPATIENT
Start: 2021-02-03 | End: 2021-02-05 | Stop reason: HOSPADM

## 2021-02-02 RX ORDER — ONDANSETRON 2 MG/ML
4 INJECTION INTRAMUSCULAR; INTRAVENOUS EVERY 6 HOURS PRN
Status: DISCONTINUED | OUTPATIENT
Start: 2021-02-02 | End: 2021-02-05 | Stop reason: HOSPADM

## 2021-02-02 RX ORDER — HEPARIN SODIUM 1000 [USP'U]/ML
2000 INJECTION, SOLUTION INTRAVENOUS; SUBCUTANEOUS
Status: DISCONTINUED | OUTPATIENT
Start: 2021-02-02 | End: 2021-02-04

## 2021-02-02 RX ORDER — OXYCODONE HYDROCHLORIDE 5 MG/1
5 TABLET ORAL EVERY 4 HOURS PRN
Status: DISCONTINUED | OUTPATIENT
Start: 2021-02-02 | End: 2021-02-05 | Stop reason: HOSPADM

## 2021-02-02 RX ORDER — SODIUM CHLORIDE, SODIUM LACTATE, POTASSIUM CHLORIDE, CALCIUM CHLORIDE 600; 310; 30; 20 MG/100ML; MG/100ML; MG/100ML; MG/100ML
50 INJECTION, SOLUTION INTRAVENOUS CONTINUOUS
Status: DISCONTINUED | OUTPATIENT
Start: 2021-02-02 | End: 2021-02-05 | Stop reason: HOSPADM

## 2021-02-02 RX ORDER — MEMANTINE HYDROCHLORIDE 5 MG/1
5 TABLET ORAL DAILY
Status: DISCONTINUED | OUTPATIENT
Start: 2021-02-03 | End: 2021-02-05 | Stop reason: HOSPADM

## 2021-02-02 RX ORDER — AMLODIPINE BESYLATE 5 MG/1
5 TABLET ORAL DAILY
Status: DISCONTINUED | OUTPATIENT
Start: 2021-02-03 | End: 2021-02-05 | Stop reason: HOSPADM

## 2021-02-02 RX ORDER — AMIODARONE HYDROCHLORIDE 200 MG/1
100 TABLET ORAL DAILY
Status: DISCONTINUED | OUTPATIENT
Start: 2021-02-03 | End: 2021-02-05 | Stop reason: HOSPADM

## 2021-02-02 RX ADMIN — ACETAMINOPHEN 975 MG: 325 TABLET, FILM COATED ORAL at 12:50

## 2021-02-02 RX ADMIN — FENTANYL CITRATE 25 MCG: 50 INJECTION, SOLUTION INTRAMUSCULAR; INTRAVENOUS at 15:03

## 2021-02-02 RX ADMIN — HEPARIN SODIUM 4000 UNITS: 1000 INJECTION INTRAVENOUS; SUBCUTANEOUS at 12:40

## 2021-02-02 RX ADMIN — DONEPEZIL HYDROCHLORIDE 10 MG: 5 TABLET ORAL at 21:38

## 2021-02-02 RX ADMIN — HEPARIN SODIUM 18 UNITS/KG/HR: 10000 INJECTION, SOLUTION INTRAVENOUS at 12:40

## 2021-02-02 RX ADMIN — OXYCODONE HYDROCHLORIDE 5 MG: 5 TABLET ORAL at 18:44

## 2021-02-02 RX ADMIN — SODIUM CHLORIDE, SODIUM LACTATE, POTASSIUM CHLORIDE, AND CALCIUM CHLORIDE 75 ML/HR: .6; .31; .03; .02 INJECTION, SOLUTION INTRAVENOUS at 18:34

## 2021-02-02 RX ADMIN — IOHEXOL 100 ML: 350 INJECTION, SOLUTION INTRAVENOUS at 15:54

## 2021-02-02 NOTE — H&P
H&P Exam - General Surgery   Anh Siddiqi 80 y o  female MRN: [id number]  Unit/Bed#: ED 06 Encounter: [id number]    Assessment/Plan     Assessment:  · Ischemia of right lower extremity -- at least Lea category 4, ischemic pain at rest, cyanotic and mottled from mid thigh to toes, no skin breakdown appreciated, patient has minimal internal and external rotation of RLE intact but no motor function of knee, ankle, toes and completely insensate at knee and below, femoral pulse 1+ no popliteal pulse palpated  · Chronic atrial fibrillation -- on Eliquis at home  · Non insulin-dependent diabetes mellitus  -- most recent Hgb A1c 6 8 in Dec 2020  · History of MRSA  · Dementia       Plan:  · LEAD of RLE and CTA performed  · Eliquis on hold  · Therapeutic heparin gtt  · Patient is level 3 DNR/DNI  · Consulted vascular surgery, attending spoke with vascular surgeon on call, appreciate their input  CTA shows occlusion at bifurcation of common femoral, they suggested comfort care or palliative above knee amputation if patient's pain is intolerable  · Consulted cardiology, they will see patient tomorrow only if daughters are interested in palliative surgery  · Order ECHO only if surgical intervention likely to be pursued  · Lengthy discussion about possible outcomes reviewed with daughter Peewee Haywood at bedsisde        History of Present Illness       HPI:  Anh Siddiqi is a 80 y o  female with history of dementia, chronic AFib on Eliquis, and NIDDM who presents from [place] [address] with right leg pain for an unknown length of time  History, ROS and PFSH obtained from chart review, ED provider, and daughter Peewee Haywood at bedside  Patient complains of leg pain but cannot provide any further history  Daughter explains patient has never had anything like this before  Patient is nonambulatory at baseline            Review of Systems   Unable to perform ROS: Dementia       Historical Information   Past Medical History: Diagnosis Date    Arthritis     [facility] [facility]     Questionable    Dementia     Diabetes mellitus ([facility] [address] [facility]     History of methicillin resistant staphylococcus aureus (MRSA) 2016    Hypertension     Psychiatric disorder      Past Surgical History:   Procedure Laterality Date    CATARACT EXTRACTION      bilateral    CHOLECYSTECTOMY      FRACTURE SURGERY      left wrist , plates and pins, right ankle plate and pins    HYSTERECTOMY      NERVE BLOCK Bilateral 3/29/2018    Procedure: B/L L3 L4 L5 S1 MBB #1 ([id number]); Surgeon: Flaquito Coley MD;  Location: [facility] MAIN OR;  Service: Pain Management     NERVE BLOCK Bilateral 5/2/2018    Procedure: B/L L3 L4 L5 S1 Mbb #2 ([id number]);   Surgeon: Flaquito Coley MD;  Location: [facility] MAIN OR;  Service: Pain Management     RADIOFREQUENCY ABLATION Left 5/18/2018    Procedure: Left L3 L4 L5 S1 Radio Frequency Ablation;  Surgeon: Flaquito Coley MD;  Location: [facility] MAIN OR;  Service: Pain Management     TONSILLECTOMY      WHIPPLE PROCEDURE W/ LAPAROSCOPY       Social History   Social History     Substance and Sexual Activity   Alcohol Use No     Social History     Substance and Sexual Activity   Drug Use No     Social History     Tobacco Use   Smoking Status Never Smoker   Smokeless Tobacco Never Used     E-Cigarette/Vaping     E-Cigarette/Vaping Substances     Family History: non-contributory    Meds/Allergies   all medications and allergies reviewed  No Known Allergies    Objective   First Vitals:   Blood Pressure: 140/67 (02/02/21 1157)  Pulse: 70 (02/02/21 1157)  Temperature: 98 7 °F (37 1 °C) (02/02/21 1157)  Temp Source: Tympanic (02/02/21 1157)  Respirations: 16 (02/02/21 1157)  Weight - Scale: 54 kg (119 lb) (02/02/21 1219)  SpO2: 96 % (02/02/21 1157)    Current Vitals:   Blood Pressure: 133/54 (02/02/21 1300)  Pulse: 78 (02/02/21 1300)  Temperature: 98 7 °F (37 1 °C) (02/02/21 1157)  Temp Source: Tympanic (02/02/21 1157)  Respirations: 22 (02/02/21 1300)  Weight - Scale: 54 kg (119 lb) (02/02/21 1219)  SpO2: 96 % (02/02/21 1300)    No intake or output data in the 24 hours ending 02/02/21 1329    Invasive Devices     Peripheral Intravenous Line            Peripheral IV 02/02/21 Left Wrist less than 1 day    Peripheral IV 02/02/21 Right Forearm less than 1 day                Physical Exam  Vitals signs reviewed  Constitutional:       General: She is awake  She is not in acute distress  Appearance: Normal appearance  She is well-developed and underweight  She is not toxic-appearing or diaphoretic  Interventions: She is not intubated  HENT:      Head: Normocephalic and atraumatic  Not macrocephalic and not microcephalic  No raccoon eyes, Royal's sign, right periorbital erythema or left periorbital erythema  Right Ear: External ear normal       Left Ear: External ear normal       Nose: Nose normal    Eyes:      General: No scleral icterus  Right eye: No discharge  Left eye: No discharge  Conjunctiva/sclera: Conjunctivae normal       Right eye: Right conjunctiva is not injected  No hemorrhage  Left eye: Left conjunctiva is not injected  No hemorrhage  Pupils: Pupils are equal, round, and reactive to light  Comments: +pinpoint pupils   Cardiovascular:      Rate and Rhythm: Normal rate  Rhythm irregularly irregular  Pulses:           Femoral pulses are 1+ on the right side  Popliteal pulses are 0 on the right side  Dorsalis pedis pulses are 0 on the right side  Posterior tibial pulses are 0 on the right side  Pulmonary:      Effort: Pulmonary effort is normal  No tachypnea, bradypnea or respiratory distress  She is not intubated  Breath sounds: Normal breath sounds  No stridor  No decreased breath sounds, wheezing or rhonchi  Abdominal:      General: There is no distension  Palpations: Abdomen is soft  Abdomen is not rigid  Tenderness:  There is no abdominal tenderness  There is no guarding or rebound  Musculoskeletal:      Right knee: She exhibits decreased range of motion  Right lower leg: No edema  Left lower leg: No edema  Right foot: Decreased range of motion  Left foot: Decreased range of motion  Feet:      Right foot:      Skin integrity: Dry skin present  No skin breakdown  Toenail Condition: Right toenails are abnormally thick  Left foot:      Skin integrity: Dry skin present  No skin breakdown  Toenail Condition: Left toenails are abnormally thick  Comments: +insensate RLE proximal knee and below  Skin:     Coloration: Skin is mottled (RLE)  Skin is not jaundiced  Neurological:      General: No focal deficit present  Mental Status: She is alert and oriented to person, place, and time  She is not disoriented  GCS: GCS eye subscore is 4  GCS verbal subscore is 5  GCS motor subscore is 6  Cranial Nerves: No cranial nerve deficit  Motor: Weakness and atrophy present  Comments: patient has minimal internal and external rotation of RLE intact but no motor function of right knee, ankle, toes and completely insensate at knee and below     Psychiatric:         Speech: Speech normal          Behavior: Behavior normal  Behavior is cooperative  Lab Results:   I have personally reviewed pertinent lab results    , CBC:   Lab Results   Component Value Date    WBC 11 71 (H) 02/02/2021    HGB 15 7 (H) 02/02/2021    HCT 50 3 (H) 02/02/2021    MCV 94 02/02/2021     02/02/2021    MCH 29 2 02/02/2021    MCHC 31 2 (L) 02/02/2021    RDW 15 8 (H) 02/02/2021    MPV 10 4 02/02/2021    NRBC 0 02/02/2021   , CMP:   Lab Results   Component Value Date    SODIUM 143 02/02/2021    K 4 0 02/02/2021     02/02/2021    CO2 26 02/02/2021    BUN 8 02/02/2021    CREATININE 0 93 02/02/2021    CALCIUM 8 5 02/02/2021    AST 56 (H) 02/02/2021    ALT 25 02/02/2021    ALKPHOS 103 02/02/2021    EGFR 54 02/02/2021   , Coagulation:   Lab Results   Component Value Date    INR 1 06 02/02/2021     Imaging: I have personally reviewed pertinent reports  EKG, Pathology, and Other Studies: I have personally reviewed pertinent reports  Code Status: Prior  Advance Directive and Living Will:      Power of :    POLST:      Counseling / Coordination of Care  Total floor / unit time spent today 55 minutes  Greater than 50% of total time was spent with the patient and / or family counseling and / or coordination of care  A description of the counseling / coordination of care: Obtaining patient history, performing physical exam, reviewing pertinent labs and imaging, discussed management with attending physician and consulted services, lengthy discussion with family  Joi Shaver

## 2021-02-02 NOTE — CONSULTS
Consult Note - Vascular Surgery   [facility]: [phone]    Assessment:   80 y o  nonambulatory female nursing home resident with advanced dementia, HTN, type 2 DM, and atrial fibrillation on Eliquis who presents with advanced RLE critical limb ischemia 2/2 R CFA occlusion of unknown duration w/loss of motor function and sensation    Plan:  Case and CTA results discussed at length with Dr Varun Peña and Dr Rosa Monroy  No role for revascularization given extent and duration of ischemia, advanced age, nonambulatory status and comorbidities  Recommend anticoagulation, palliative care/comfort care vs palliative R AKA  Discussed with Mckenna Burrows PA-C  Will see on a p r n  basis  Please call if we can be of any further assistance  Thank you for allowing us to participate in the care of this patient   ______________________________________________________________________    Consulting Service: General Surgery    Chief Complaint:  Right lower extremity limb ischemia    HPI: Raul Beth is a 80 y o  nonambulatory female nursing home resident with advanced dementia, HTN, type 2 DM, and atrial fibrillation on Eliquis who presents with RLE limb ischemia of unknown duration  Patient presents to ER from nursing facility with cold right lower extremity with mottling from mid thigh to foot, no motor function and insensate per general surgery PA  TAMMY with monophasic waveforms in R SWETHA/EIA with no flow identified from R CFA to DP, R JOSE ALFREDO 0  Vascular surgery consulted for recommendations for revascularization  Case d/w Dr Ordaz Se directly by general surgery and no role for revascularization recommend  Case also discussed directly with Dr Varun ePña  CTA abdomen/pelvis with runoff obtained to delineate level of occlusion to determine treatment options  CTA images reviewed with patent aortoiliac segments bilaterally but occlusion or R proximal CFA with minimal reconstitution of diseased distal tibioperoneal vessels  CTA results reviewed with Dr Makenna Eaton and Dr Fly Anne  Patient not personally examined  Exam by general surgery PA  Review of Systems:  Unobtainable secondary to dementia    Past Medical History:  Past Medical History:   Diagnosis Date    Arthritis     [facility] [address]     Questionable    Dementia ([facility] [address] [facility]     Diabetes mellitus ([facility] [address] [facility]     History of methicillin resistant staphylococcus aureus (MRSA) 2016    Hypertension     Psychiatric disorder        Past Surgical History:  Past Surgical History:   Procedure Laterality Date    CATARACT EXTRACTION      bilateral    CHOLECYSTECTOMY      FRACTURE SURGERY      left wrist , plates and pins, right ankle plate and pins    HYSTERECTOMY      NERVE BLOCK Bilateral 3/29/2018    Procedure: B/L L3 L4 L5 S1 MBB #1 ([id number]); Surgeon: Aurora Thomas MD;  Location: [facility] MAIN OR;  Service: Pain Management     NERVE BLOCK Bilateral 5/2/2018    Procedure: B/L L3 L4 L5 S1 Mbb #2 ([id number]); Surgeon: Aurora Thomas MD;  Location: [facility] MAIN OR;  Service: Pain Management     RADIOFREQUENCY ABLATION Left 5/18/2018    Procedure: Left L3 L4 L5 S1 Radio Frequency Ablation;  Surgeon: Aurora Thomas MD;  Location: [place] [address] MAIN OR;  Service: Pain Management     TONSILLECTOMY      WHIPPLE PROCEDURE W/ LAPAROSCOPY         Social History:  Social History     Substance and Sexual Activity   Alcohol Use No     Social History     Substance and Sexual Activity   Drug Use No     Social History     Tobacco Use   Smoking Status Never Smoker   Smokeless Tobacco Never Used       Family History:  Family History   Family history unknown:  Yes       Allergies:  No Known Allergies    Medications:  Current Facility-Administered Medications   Medication Dose Route Frequency    heparin (porcine) 25,000 units in 0 45% NaCl 250 mL infusion (premix)  3-30 Units/kg/hr (Order-Specific) Intravenous Titrated    heparin (porcine) injection 2,000 Units  2,000 Units Intravenous Q1H PRN    heparin (porcine) injection 4,000 Units  4,000 Units Intravenous Q1H PRN    insulin lispro (HumaLOG) 100 units/mL subcutaneous injection 1-5 Units  1-5 Units Subcutaneous Q6H [facility]       Vitals:  /58 (02/02/21 1530)    Temp      Pulse 76 (02/02/21 1530)   Resp (!) 23 (02/02/21 1530)    SpO2 96 % (02/02/21 1530)      I/Os:  No intake/output data recorded  No intake/output data recorded      Lab Results and Cultures:   CBC with diff:   Lab Results   Component Value Date    WBC 11 71 (H) 02/02/2021    HGB 15 7 (H) 02/02/2021    HCT 50 3 (H) 02/02/2021    MCV 94 02/02/2021     02/02/2021    ADJUSTEDWBC 8 10 04/27/2016    MCH 29 2 02/02/2021    MCHC 31 2 (L) 02/02/2021    RDW 15 8 (H) 02/02/2021    MPV 10 4 02/02/2021    NRBC 0 02/02/2021   ,   BMP/CMP:  Lab Results   Component Value Date     01/06/2016    K 4 0 02/02/2021    K 3 8 01/06/2016     02/02/2021     01/06/2016    CO2 26 02/02/2021    CO2 24 01/06/2016    BUN 8 02/02/2021    BUN 11 01/06/2016    CREATININE 0 93 02/02/2021    CREATININE 0 69 01/06/2016    GLUCOSE 168 (H) 01/06/2016    CALCIUM 8 5 02/02/2021    CALCIUM 9 3 01/06/2016    AST 56 (H) 02/02/2021    AST 32 01/06/2016    ALT 25 02/02/2021    ALT 21 01/06/2016    ALKPHOS 103 02/02/2021    ALKPHOS 125 (H) 01/06/2016    PROT 6 3 01/06/2016    BILITOT 0 6 01/06/2016    EGFR 54 02/02/2021   ,   Lipid Panel:   Lab Results   Component Value Date    CHOL 173 01/06/2016   ,   Coags:   Lab Results   Component Value Date    PTT 27 02/02/2021    INR 1 06 02/02/2021   ,     Blood Culture: No results found for: BLOODCX,   Urinalysis:   Lab Results   Component Value Date    COLORU Yellow 06/13/2016    CLARITYU Clear 06/13/2016    SPECGRAV 1 010 06/13/2016    PHUR 6 5 06/13/2016    LEUKOCYTESUR Negative 06/13/2016    NITRITE Negative 06/13/2016    GLUCOSEU Negative 06/13/2016    KETONESU Negative 06/13/2016    BILIRUBINUR Negative 06/13/2016    BLOODU Negative 06/13/2016   ,   Urine Culture:   Lab Results   Component Value Date    URINECX No Growth <1000 cfu/mL 06/13/2016   ,   Wound Culure: No results found for: WOUNDCULT    Imaging:  TAMMY and CTA abdomen/pelvis with bilateral iliofemoral runoff reviewed and as described above          Andreia Mackay PA-C  2/2/2021

## 2021-02-02 NOTE — ED NOTES
This RN obtained Blake Hardy  Spoke with the lab who verified that they do not need the Blake Josue RN  [facility] Donivan Goldmann RN  02/02/21 [id number]

## 2021-02-02 NOTE — ED NOTES
This nurse, charge nurse and Dr Corry Kim have reviewed visiting policy with daughter who asks for special permission to stay with patient on nursing unit, she is aware that she cannot stay with patient      Alethea Dalton RN  02/02/21 0071

## 2021-02-02 NOTE — ED NOTES
Marybel BALTAZAR here in ED and discusses CT with daughter  CT will be done and then will return to ED and then will go to floor       Norbert Mitchell RN  02/02/21 [id number]

## 2021-02-02 NOTE — ED NOTES
Vascular lab called and stated they would be up shortly to scan the patient     Hector Mittal RN  02/02/21 3705

## 2021-02-02 NOTE — PLAN OF CARE
Problem: Potential for Falls  Goal: Patient will remain free of falls  Description: INTERVENTIONS:  - Assess patient frequently for physical needs  -  Identify cognitive and physical deficits and behaviors that affect risk of falls    -  [place] fall precautions as indicated by assessment   - Educate patient/family on patient safety including physical limitations  - Instruct patient to call for assistance with activity based on assessment  - Modify environment to reduce risk of injury  - Consider OT/PT consult to assist with strengthening/mobility  Outcome: Progressing     Problem: Prexisting or High Potential for Compromised Skin Integrity  Goal: Skin integrity is maintained or improved  Description: INTERVENTIONS:  - Identify patients at risk for skin breakdown  - Assess and monitor skin integrity  - Assess and monitor nutrition and hydration status  - Monitor labs   - Assess for incontinence   - Turn and reposition patient  - Assist with mobility/ambulation  - Relieve pressure over bony prominences  - Avoid friction and shearing  - Provide appropriate hygiene as needed including keeping skin clean and dry  - Evaluate need for skin moisturizer/barrier cream  - Collaborate with interdisciplinary team   - Patient/family teaching  - Consider wound care consult   Outcome: Progressing

## 2021-02-02 NOTE — ED PROVIDER NOTES
History  Chief Complaint   Patient presents with    Cold Extremity     Pt arrives BLS from [facility], reported R leg pain, R leg noted purple, cold to touch  No pulses on doppler  + doppler on femoral     Patient is a 80year old F with a past medical history significant for atrial fibrillation on eliquis, chronic low back pain, Alzheimer's dementia, hypertension, hyperlipidemia, diabetes who presents from nursing facility with right lower leg pain x few days  Patient is pleasantly confused, unable to provide hisotry  Sometimes states leg hurts, sometimes denies pain  Prior to Admission Medications   Prescriptions Last Dose Informant Patient Reported? Taking? ALPRAZolam (XANAX) 0 5 mg tablet   Yes No   Sig: Take 0 5 mg by mouth daily at bedtime as needed for anxiety   DULoxetine (CYMBALTA) 60 mg delayed release capsule   Yes No   Sig: Take 60 mg by mouth daily  Multiple Vitamins-Minerals (OCUVITE ADULT FORMULA PO)   Yes No   Sig: Take by mouth   acetaminophen (TYLENOL) 325 mg tablet   No No   Sig: Take 2 tablets (650 mg total) by mouth every 6 (six) hours as needed for mild pain  amLODIPine (NORVASC) 5 mg tablet   Yes No   Sig: Take 5 mg by mouth daily   amiodarone 100 mg tablet   Yes No   Sig: Take 100 mg by mouth daily   apixaban (ELIQUIS) 2 5 mg   Yes No   Sig: Take 2 5 mg by mouth 2 (two) times a day   cefadroxil (DURICEF) 500 mg capsule   Yes No   Sig: Take 500 mg by mouth 2 (two) times a day   donepezil (ARICEPT) 10 mg tablet   Yes No   Sig: Take 10 mg by mouth daily at bedtime  memantine (NAMENDA) 10 mg tablet   Yes No   Sig: Take 5 mg by mouth daily     metFORMIN (GLUCOPHAGE) 500 mg tablet   Yes No   Sig: Take 500 mg by mouth daily with breakfast    potassium chloride (K-DUR,KLOR-CON) 10 mEq tablet   Yes No   Sig: Take 10 mEq by mouth 2 (two) times a day   sitaGLIPtin (JANUVIA) 50 mg tablet   Yes No   Sig: Take 50 mg by mouth daily      Facility-Administered Medications: None Past Medical History:   Diagnosis Date    Arthritis     Atrial fibrillation ([facility]     Questionable    Dementia ([facility]     Diabetes mellitus ([facility]     History of methicillin resistant staphylococcus aureus (MRSA) 2016    Hypertension     Psychiatric disorder        Past Surgical History:   Procedure Laterality Date    CATARACT EXTRACTION      bilateral    CHOLECYSTECTOMY      FRACTURE SURGERY      left wrist , plates and pins, right ankle plate and pins    HYSTERECTOMY      NERVE BLOCK Bilateral 3/29/2018    Procedure: B/L L3 L4 L5 S1 MBB #1 ([id number]); Surgeon: Eduarda Calero MD;  Location: [facility] MAIN OR;  Service: Pain Management     NERVE BLOCK Bilateral 5/2/2018    Procedure: B/L L3 L4 L5 S1 Mbb #2 ([id number]); Surgeon: Eduarda Calero MD;  Location: [facility] MAIN OR;  Service: Pain Management     RADIOFREQUENCY ABLATION Left 5/18/2018    Procedure: Left L3 L4 L5 S1 Radio Frequency Ablation;  Surgeon: Eduarda Calero MD;  Location: [facility] MAIN OR;  Service: Pain Management     TONSILLECTOMY      WHIPPLE PROCEDURE W/ LAPAROSCOPY         Family History   Family history unknown: Yes     I have reviewed and agree with the history as documented  E-Cigarette/Vaping     E-Cigarette/Vaping Substances     Social History     Tobacco Use    Smoking status: Never Smoker    Smokeless tobacco: Never Used   Substance Use Topics    Alcohol use: No    Drug use: No       Review of Systems   Unable to perform ROS: Dementia       Physical Exam  Physical Exam  Vitals signs and nursing note reviewed  Constitutional:       General: She is not in acute distress  Appearance: Normal appearance  She is well-developed and underweight  She is not ill-appearing, toxic-appearing or diaphoretic  Comments: Frail appearing   HENT:      Head: Normocephalic and atraumatic        Right Ear: External ear normal       Left Ear: External ear normal       Nose: Nose normal    Eyes:      Extraocular Movements: Extraocular movements intact  Conjunctiva/sclera: Conjunctivae normal       Pupils: Pupils are equal, round, and reactive to light  Neck:      Musculoskeletal: Normal range of motion and neck supple  Vascular: No JVD  Trachea: No tracheal deviation  Cardiovascular:      Rate and Rhythm: Normal rate  Rhythm irregular  Heart sounds: Normal heart sounds  No murmur  No gallop  Pulmonary:      Effort: Pulmonary effort is normal  No respiratory distress  Breath sounds: Normal breath sounds  No stridor  No wheezing, rhonchi or rales  Chest:      Chest wall: No tenderness  Abdominal:      General: Bowel sounds are normal  There is no distension  Palpations: Abdomen is soft  Tenderness: There is no abdominal tenderness  There is no guarding or rebound  Musculoskeletal: Normal range of motion  Right ankle: She exhibits ecchymosis and abnormal pulse  She exhibits normal range of motion, no swelling and no deformity  Tenderness  Right upper leg: She exhibits tenderness  She exhibits no bony tenderness, no swelling, no edema, no deformity and no laceration  Right lower leg: She exhibits tenderness  She exhibits no bony tenderness, no swelling, no deformity and no laceration  Legs:       Right foot: Decreased capillary refill  Normal range of motion  Tenderness present  No bony tenderness, swelling, crepitus, deformity or laceration  Skin:     General: Skin is warm and dry  Coloration: Skin is pale  Findings: Petechiae and rash present  No erythema  Rash is purpuric  Neurological:      General: No focal deficit present  Mental Status: She is alert  Mental status is at baseline  She is disoriented  Deep Tendon Reflexes: Reflexes are normal and symmetric     Psychiatric:         Mood and Affect: Mood normal          Behavior: Behavior normal              Vital Signs  ED Triage Vitals [02/02/21 1157]   Temperature Pulse Respirations Blood Pressure SpO2   98 7 °F (37 1 °C) 70 16 140/67 96 %      Temp Source Heart Rate Source Patient Position - Orthostatic VS BP Location FiO2 (%)   Tympanic Monitor Lying Left arm --      Pain Score       No Pain           Vitals:    02/02/21 1445 02/02/21 1500 02/02/21 1515 02/02/21 1530   BP:  133/81  123/58   Pulse: 82 84 76 76   Patient Position - Orthostatic VS:             Visual Acuity      ED Medications  Medications   heparin (porcine) 25,000 units in 0 45% NaCl 250 mL infusion (premix) (18 Units/kg/hr × 50 kg (Order-Specific) Intravenous New Bag 2/2/21 1240)   heparin (porcine) injection 4,000 Units (has no administration in time range)   heparin (porcine) injection 2,000 Units (has no administration in time range)   insulin lispro (HumaLOG) 100 units/mL subcutaneous injection 1-5 Units (has no administration in time range)   heparin (porcine) injection 4,000 Units (4,000 Units Intravenous Given 2/2/21 1240)   acetaminophen (TYLENOL) tablet 975 mg (975 mg Oral Given 2/2/21 1250)   fentanyl citrate (PF) 100 MCG/2ML 25 mcg (25 mcg Intravenous Given 2/2/21 1503)   iohexol (OMNIPAQUE) 350 MG/ML injection (SINGLE-DOSE) 100 mL (100 mL Intravenous Given 2/2/21 1554)       Diagnostic Studies  Results Reviewed     Procedure Component Value Units Date/Time    Lactic acid, plasma [[id number]]  (Normal) Collected: 02/02/21 1226    Lab Status: Final result Specimen: Blood from Arm, Right Updated: 02/02/21 1251     LACTIC ACID 2 0 mmol/L     Narrative:      Result may be elevated if tourniquet was used during collection      Comprehensive metabolic panel [[id number]]  (Abnormal) Collected: 02/02/21 1201    Lab Status: Final result Specimen: Blood from Arm, Right Updated: 02/02/21 1221     Sodium 143 mmol/L      Potassium 4 0 mmol/L      Chloride 107 mmol/L      CO2 26 mmol/L      ANION GAP 10 mmol/L      BUN 8 mg/dL      Creatinine 0 93 mg/dL      Glucose 143 mg/dL      Calcium 8 5 mg/dL      Corrected Calcium 9 9 mg/dL      AST 56 U/L      ALT 25 U/L      Alkaline Phosphatase 103 U/L      Total Protein 5 9 g/dL      Albumin 2 3 g/dL      Total Bilirubin 0 30 mg/dL      eGFR 54 ml/min/1 73sq m     Narrative:      [place] guidelines for Chronic Kidney Disease (CKD):     Stage 1 with normal or high GFR (GFR > 90 mL/min/1 73 square meters)    Stage 2 Mild CKD (GFR = 60-89 mL/min/1 73 square meters)    Stage 3A Moderate CKD (GFR = 45-59 mL/min/1 73 square meters)    Stage 3B Moderate CKD (GFR = 30-44 mL/min/1 73 square meters)    Stage 4 Severe CKD (GFR = 15-29 mL/min/1 73 square meters)    Stage 5 End Stage CKD (GFR <15 mL/min/1 73 square meters)  Note: GFR calculation is accurate only with a steady state creatinine    Protime-INR [[id number]]  (Normal) Collected: 02/02/21 1201    Lab Status: Final result Specimen: Blood from Arm, Right Updated: 02/02/21 1217     Protime 13 7 seconds      INR 1 06    APTT [[id number]]  (Normal) Collected: 02/02/21 1201    Lab Status: Final result Specimen: Blood from Arm, Right Updated: 02/02/21 1217     PTT 27 seconds     CBC and differential [[id number]]  (Abnormal) Collected: 02/02/21 1201    Lab Status: Final result Specimen: Blood from Arm, Right Updated: 02/02/21 1206     WBC 11 71 Thousand/uL      RBC 5 37 Million/uL      Hemoglobin 15 7 g/dL      Hematocrit 50 3 %      MCV 94 fL      MCH 29 2 pg      MCHC 31 2 g/dL      RDW 15 8 %      MPV 10 4 fL      Platelets 367 Thousands/uL      nRBC 0 /100 WBCs      Neutrophils Relative 83 %      Immat GRANS % 1 %      Lymphocytes Relative 9 %      Monocytes Relative 6 %      Eosinophils Relative 1 %      Basophils Relative 0 %      Neutrophils Absolute 9 83 Thousands/µL      Immature Grans Absolute 0 06 Thousand/uL      Lymphocytes Absolute 1 06 Thousands/µL      Monocytes Absolute 0 65 Thousand/µL      Eosinophils Absolute 0 08 Thousand/µL      Basophils Absolute 0 03 Thousands/µL                  VAS lower limb arterial duplex, limited, unilateral    (Results Pending)   CTA ABDOMEN W RUN OFF W WO CONTRAST    (Results Pending)              Procedures  ECG 12 Lead Documentation Only    Date/Time: 2/2/2021 12:34 PM  Performed by: Isabel Delgado DO  Authorized by: Isabel Delgado DO     Indications / Diagnosis:  RLE ischemia  Patient location:  ED  Previous ECG:     Previous ECG:  Compared to current    Comparison ECG info:  2/25/2016    Similarity:  No change  Interpretation:     Interpretation: non-specific    Rate:     ECG rate:  73    ECG rate assessment: normal    Rhythm:     Rhythm: sinus rhythm and atrial fibrillation    Ectopy:     Ectopy: none    QRS:     QRS axis:  Normal    QRS intervals:  Normal  Conduction:     Conduction: normal    ST segments:     ST segments:  Non-specific  T waves:     T waves: normal    Comments:                   ED Course  ED Course as of Feb 02 1624   Tue Feb 02, 2021   1207 Discussed with Dr Itzel Luna, vascular surgery who recommends starting heparin infusion  As patent is at least a Conor 3 classification, Dr Itzel Luna recommends AKA which he recommends discussing with the general surgeon on call  He states that revascularization will likely result in worsening and that is why he is recommending surgery  I will discuss with general surgery at this time  1214 Discussing case with Dr Dana Duval will call patient's daughter as patient cannot make decisions for herself secondary to the dementia  According to Dr Dana Duval, patient will need to be admitted to the medical service with surgical consultation as he states that we need to wait for a line of demarcation in order to assess whether patient will need an AKA or a more invasive procedure  Currently, agreeable with heparin infusion  Dr Dana Duval will speak with patient's daughter to explain the risks/benefits of procedure         [address] [facility] [address] Dana Duval is trying to reach Mike Dolores, cell # [phone] [address] [facility] [address] Jaun Warner spoke with Ashli Jamison, patient's daughter  Patient's daughter amenable to admission to medical team, and will need to further discuss whether amputation is in patient's best interest/ if surgery would be in her wishes with her other sisters  Michelle Fontana, patient's other daughter is here in the ED now, she states that patient is a DNR/ DNI  Otherwise, will discuss further options for potential surgery with Dr Jaun Warner as a family once all sisters available  [id number] Vascular tech reports that there is flow to external iliac, monophasic  Otherwise no flow to anything lower than that  JOSE ALFREDO flat  Left JOSE ALFREDO is 0 78      1430 Relayed findings to Dr Jaun Warner who stated that he will discuss with vascular team       [phone] Patient is reaching for the leg and crying  Will order 25mcg dose of fentanyl for pain relief  SBIRT 22yo+      Most Recent Value   SBIRT (22 yo +)   In order to provide better care to our patients, we are screening all of our patients for alcohol and drug use  Would it be okay to ask you these screening questions? No Filed at: 02/02/2021 1304                    MDM  Number of Diagnoses or Management Options  Ischemia of right lower extremity:   Diagnosis management comments: Assessment and Plan:   80year old F presenting with RLE pain, pallor, pulselessness for unknown amount of time  Patient has been potentially complaining of pain for a few days  Concern for arterial occlusion as source of ischemia  STAT vascular surgery consult        Disposition  Final diagnoses:   Ischemia of right lower extremity     Time reflects when diagnosis was documented in both MDM as applicable and the Disposition within this note     Time User Action Codes Description Comment    2/2/2021  1:23 PM Saqib Sheppard Add [I99 8] Ischemia of right lower extremity     2/2/2021  3:10 PM Nba Breen Add [I48 20] Chronic atrial fibrillation [facility])       ED Disposition     ED Disposition Condition Date/Time Comment    Admit Stable Tue Feb 2, 2021  1:23 PM Case was discussed with Dr Morton Gilford and the patient's admission status was agreed to be Admission Status: inpatient status to the service of Dr Morton Gilford   Follow-up Information    None         Current Discharge Medication List      CONTINUE these medications which have NOT CHANGED    Details   acetaminophen (TYLENOL) 325 mg tablet Take 2 tablets (650 mg total) by mouth every 6 (six) hours as needed for mild pain  Qty: 30 tablet, Refills: 0      ALPRAZolam (XANAX) 0 5 mg tablet Take 0 5 mg by mouth daily at bedtime as needed for anxiety      amiodarone 100 mg tablet Take 100 mg by mouth daily      amLODIPine (NORVASC) 5 mg tablet Take 5 mg by mouth daily      apixaban (ELIQUIS) 2 5 mg Take 2 5 mg by mouth 2 (two) times a day      cefadroxil (DURICEF) 500 mg capsule Take 500 mg by mouth 2 (two) times a day      donepezil (ARICEPT) 10 mg tablet Take 10 mg by mouth daily at bedtime  DULoxetine (CYMBALTA) 60 mg delayed release capsule Take 60 mg by mouth daily  memantine (NAMENDA) 10 mg tablet Take 5 mg by mouth daily  metFORMIN (GLUCOPHAGE) 500 mg tablet Take 500 mg by mouth daily with breakfast       Multiple Vitamins-Minerals (OCUVITE ADULT FORMULA PO) Take by mouth      potassium chloride (K-DUR,KLOR-CON) 10 mEq tablet Take 10 mEq by mouth 2 (two) times a day      sitaGLIPtin (JANUVIA) 50 mg tablet Take 50 mg by mouth daily           No discharge procedures on file      PDMP Review     None          ED Provider  Electronically Signed by           Stefany Almazan DO  02/02/21 0677

## 2021-02-03 LAB
ANION GAP SERPL CALCULATED.3IONS-SCNC: 6 MMOL/L (ref 4–13)
APTT PPP: 106 SECONDS (ref 23–37)
APTT PPP: 112 SECONDS (ref 23–37)
APTT PPP: 53 SECONDS (ref 23–37)
ATRIAL RATE: 127 BPM
BASOPHILS # BLD AUTO: 0.05 THOUSANDS/ΜL (ref 0–0.1)
BASOPHILS NFR BLD AUTO: 1 % (ref 0–1)
BUN SERPL-MCNC: 12 MG/DL (ref 5–25)
CALCIUM SERPL-MCNC: 8.1 MG/DL (ref 8.3–10.1)
CHLORIDE SERPL-SCNC: 109 MMOL/L (ref 100–108)
CO2 SERPL-SCNC: 26 MMOL/L (ref 21–32)
CREAT SERPL-MCNC: 0.89 MG/DL (ref 0.6–1.3)
EOSINOPHIL # BLD AUTO: 0.09 THOUSAND/ΜL (ref 0–0.61)
EOSINOPHIL NFR BLD AUTO: 1 % (ref 0–6)
ERYTHROCYTE [DISTWIDTH] IN BLOOD BY AUTOMATED COUNT: 16 % (ref 11.6–15.1)
GFR SERPL CREATININE-BSD FRML MDRD: 57 ML/MIN/1.73SQ M
GLUCOSE SERPL-MCNC: 111 MG/DL (ref 65–140)
GLUCOSE SERPL-MCNC: 113 MG/DL (ref 65–140)
GLUCOSE SERPL-MCNC: 121 MG/DL (ref 65–140)
GLUCOSE SERPL-MCNC: 143 MG/DL (ref 65–140)
GLUCOSE SERPL-MCNC: 155 MG/DL (ref 65–140)
HCT VFR BLD AUTO: 48.6 % (ref 34.8–46.1)
HGB BLD-MCNC: 14.9 G/DL (ref 11.5–15.4)
IMM GRANULOCYTES # BLD AUTO: 0.1 THOUSAND/UL (ref 0–0.2)
IMM GRANULOCYTES NFR BLD AUTO: 1 % (ref 0–2)
LYMPHOCYTES # BLD AUTO: 1.09 THOUSANDS/ΜL (ref 0.6–4.47)
LYMPHOCYTES NFR BLD AUTO: 11 % (ref 14–44)
MCH RBC QN AUTO: 29.3 PG (ref 26.8–34.3)
MCHC RBC AUTO-ENTMCNC: 30.7 G/DL (ref 31.4–37.4)
MCV RBC AUTO: 96 FL (ref 82–98)
MONOCYTES # BLD AUTO: 0.61 THOUSAND/ΜL (ref 0.17–1.22)
MONOCYTES NFR BLD AUTO: 6 % (ref 4–12)
NEUTROPHILS # BLD AUTO: 8.11 THOUSANDS/ΜL (ref 1.85–7.62)
NEUTS SEG NFR BLD AUTO: 80 % (ref 43–75)
NRBC BLD AUTO-RTO: 0 /100 WBCS
PLATELET # BLD AUTO: 247 THOUSANDS/UL (ref 149–390)
PMV BLD AUTO: 10.9 FL (ref 8.9–12.7)
POTASSIUM SERPL-SCNC: 3.8 MMOL/L (ref 3.5–5.3)
QRS AXIS: 24 DEGREES
QRSD INTERVAL: 76 MS
QT INTERVAL: 394 MS
QTC INTERVAL: 434 MS
RBC # BLD AUTO: 5.09 MILLION/UL (ref 3.81–5.12)
SODIUM SERPL-SCNC: 141 MMOL/L (ref 136–145)
T WAVE AXIS: 157 DEGREES
VENTRICULAR RATE: 73 BPM
WBC # BLD AUTO: 10.05 THOUSAND/UL (ref 4.31–10.16)

## 2021-02-03 PROCEDURE — 85025 COMPLETE CBC W/AUTO DIFF WBC: CPT | Performed by: PHYSICIAN ASSISTANT

## 2021-02-03 PROCEDURE — 85730 THROMBOPLASTIN TIME PARTIAL: CPT | Performed by: SPECIALIST

## 2021-02-03 PROCEDURE — 93010 ELECTROCARDIOGRAM REPORT: CPT | Performed by: INTERNAL MEDICINE

## 2021-02-03 PROCEDURE — NC001 PR NO CHARGE: Performed by: SPECIALIST

## 2021-02-03 PROCEDURE — 93926 LOWER EXTREMITY STUDY: CPT | Performed by: SURGERY

## 2021-02-03 PROCEDURE — 82948 REAGENT STRIP/BLOOD GLUCOSE: CPT

## 2021-02-03 PROCEDURE — 99233 SBSQ HOSP IP/OBS HIGH 50: CPT | Performed by: PHYSICIAN ASSISTANT

## 2021-02-03 PROCEDURE — 93922 UPR/L XTREMITY ART 2 LEVELS: CPT | Performed by: SURGERY

## 2021-02-03 PROCEDURE — 80048 BASIC METABOLIC PNL TOTAL CA: CPT | Performed by: PHYSICIAN ASSISTANT

## 2021-02-03 RX ADMIN — OXYCODONE HYDROCHLORIDE 5 MG: 5 TABLET ORAL at 14:19

## 2021-02-03 RX ADMIN — OXYCODONE HYDROCHLORIDE 5 MG: 5 TABLET ORAL at 02:33

## 2021-02-03 RX ADMIN — HEPARIN SODIUM 17 UNITS/KG/HR: 10000 INJECTION, SOLUTION INTRAVENOUS at 14:23

## 2021-02-03 RX ADMIN — SODIUM CHLORIDE, SODIUM LACTATE, POTASSIUM CHLORIDE, AND CALCIUM CHLORIDE 75 ML/HR: .6; .31; .03; .02 INJECTION, SOLUTION INTRAVENOUS at 08:17

## 2021-02-03 RX ADMIN — DULOXETINE 60 MG: 60 CAPSULE, DELAYED RELEASE ORAL at 09:38

## 2021-02-03 RX ADMIN — DONEPEZIL HYDROCHLORIDE 10 MG: 5 TABLET ORAL at 21:36

## 2021-02-03 RX ADMIN — HEPARIN SODIUM 2000 UNITS: 1000 INJECTION INTRAVENOUS; SUBCUTANEOUS at 14:20

## 2021-02-03 RX ADMIN — AMLODIPINE BESYLATE 5 MG: 5 TABLET ORAL at 09:38

## 2021-02-03 RX ADMIN — AMIODARONE HYDROCHLORIDE 100 MG: 200 TABLET ORAL at 09:38

## 2021-02-03 RX ADMIN — OXYCODONE HYDROCHLORIDE 5 MG: 5 TABLET ORAL at 09:37

## 2021-02-03 RX ADMIN — HEPARIN SODIUM 17 UNITS/KG/HR: 10000 INJECTION, SOLUTION INTRAVENOUS at 18:49

## 2021-02-03 RX ADMIN — ALPRAZOLAM 0.5 MG: 0.5 TABLET ORAL at 00:22

## 2021-02-03 RX ADMIN — MEMANTINE 5 MG: 5 TABLET ORAL at 09:38

## 2021-02-03 RX ADMIN — INSULIN LISPRO 1 UNITS: 100 INJECTION, SOLUTION INTRAVENOUS; SUBCUTANEOUS at 18:53

## 2021-02-03 NOTE — CASE MANAGEMENT
SW following to assist with DCP  Pt was evaluated by [facility] and was found not meeting inpatient hospice criteria at this time  CLIFF spoke with daughter, Herbert Mcallister, about determination and alternate options  Pat requested referral be made to [facility] for evaluation  Referral has been made  Daughter wants to talk with Dr Checo Villalpando again tomorrow about plans and pt's prognosis before making any decisions about returning to skilled nursing facility  Daughter does not want pt returning to [facility] Donna CORONADO again offered to email daughter list of facilities for her and her family to review for alternate choices  Daughter provided email address, [email or web address]  [email or web address], and email was sent  SW will continue to follow to monitor needs/progress and assist with planning as needed

## 2021-02-03 NOTE — CASE MANAGEMENT
LOS - 1 day    SW following to monitor needs and assist with planning  Case discussed with surgery Nay BALTAZAR  Pt has ischemic right leg and surgical intervention is not planned  Surgical team talked with daughters and they are interested in comfort/hospice care for pt  Hospice evaluation order written  Pt is a long term resident at [facility]  SW spoke with unit manager at [address] that provided care to pt  Pt has dementia  Per unit manager pt was assist of one for ADLs and mobility  Pt was participating in PT and would sit in groups throughout the day  Unit manager said the progression of her leg was very quick  SW placed call to daughters to discuss options for hospice care  SW could not reach Antony  Spoke briefly with Bill Díaz who asked SW to call her sister, Jf Chavez  SW called Rhoades Millán gilbert Baum and was able to discuss plan and options  Jf Chavez confirmed that family is interested in hospice care and after talking with surgeon was hoping pt would be eligible for hospice in hospital   SW provided information on different levels of hospice, inpatient and home  SW offered that if pt is not eligible for inpatient hospice care pt could return to [facility] and receive hospice care there  Family does not want pt to return to [facility]  SW offered to send daughter a list of alternate facilities however daughter declined at this time  Requested hospice evaluation be done first   After reviewing inpatient hospice options, [facility] and [address], daughter requested all options be explored starting with hospice at [facility]  Referral has been made to [facility]  Hospice nurse will be in this afternoon to evaluate pt  SW will continue to follow to monitor needs/progress and assist with planning as needed

## 2021-02-03 NOTE — PROGRESS NOTES
Had a lengthy discussion this morning with  Patient's daughters   Isabelle Rodrigues   on phone  Explained about the poor circulation  And as per vascular not a candidate for revascularization   limb loss requiring amputation  Possible high mid thigh depending upon line of demarcation        risk of nonhealing infection pain were explained to the patient family member     family deciding no amputation at present   they want to go for hospice care/ comfort care     will discuss with

## 2021-02-03 NOTE — PROGRESS NOTES
Progress Note - General Surgery   Cathren Sancho 80 y o  female MRN: [id number]  Unit/Bed#: [address] [place] [address] Encounter: [id number]    Assessment:  · Advanced ischemia of right lower extremity -- mottling extends from toes to above knee joint, loss of sensation and motor function, ischemic pain  · Chronic atrial fibrillation -- on Eliquis at home  · Non insulin-dependent diabetes mellitus   · History of MRSA  · Dementia       Plan:  Case discussed at length with the vascular team   They are not recommending any revascularization efforts given patient's condition and extent and duration of ischemia  Continue heparin gtt  Will discuss surgical options vs comfort care with daughters  She is level 3 DNR/DNI        Subjective/Objective     Subjective:  Patient not oriented to person place or time    Objective:   AVSS  Labs WNL    Blood pressure 128/60, pulse 84, temperature 97 5 °F (36 4 °C), temperature source Oral, resp  rate 16, height 5' (1 524 m), weight 54 kg (119 lb), SpO2 93 %  ,Body mass index is 23 24 kg/m²      No intake or output data in the 24 hours ending 02/03/21 0923    Invasive Devices     Peripheral Intravenous Line            Peripheral IV 02/02/21 Left Wrist less than 1 day    Peripheral IV 02/02/21 Right Forearm less than 1 day                Physical Exam: /60 (BP Location: Left arm)   Pulse 84   Temp 97 5 °F (36 4 °C) (Oral)   Resp 16   Ht 5' (1 524 m)   Wt 54 kg (119 lb)   SpO2 93%   BMI 23 24 kg/m²   General appearance: arousable, speech is clear, not oriented to person place or time  Head: Normocephalic, without obvious abnormality, atraumatic  Heart: irregularly irregular rhythm  Abdomen: soft, non-tender; bowel sounds normal; no masses,  no organomegaly  Extremities: RLE mottled similar exam to yesterday, cyanosis not advancing more proximal at this time, no sensation or motor function of RLE  Skin: Skin color, texture, turgor normal  No rashes or lesions    Lab, Imaging and other studies:  I have personally reviewed pertinent lab results    , CBC:   Lab Results   Component Value Date    WBC 10 05 02/03/2021    HGB 14 9 02/03/2021    HCT 48 6 (H) 02/03/2021    MCV 96 02/03/2021     02/03/2021    MCH 29 3 02/03/2021    MCHC 30 7 (L) 02/03/2021    RDW 16 0 (H) 02/03/2021    MPV 10 9 02/03/2021    NRBC 0 02/03/2021   , CMP:   Lab Results   Component Value Date    SODIUM 141 02/03/2021    K 3 8 02/03/2021     (H) 02/03/2021    CO2 26 02/03/2021    BUN 12 02/03/2021    CREATININE 0 89 02/03/2021    CALCIUM 8 1 (L) 02/03/2021    AST 56 (H) 02/02/2021    ALT 25 02/02/2021    ALKPHOS 103 02/02/2021    EGFR 57 02/03/2021     VTE Pharmacologic Prophylaxis: Heparin gtt  VTE Mechanical Prophylaxis: sequential compression device

## 2021-02-04 LAB
APTT PPP: 84 SECONDS (ref 23–37)
GLUCOSE SERPL-MCNC: 126 MG/DL (ref 65–140)
GLUCOSE SERPL-MCNC: 127 MG/DL (ref 65–140)
GLUCOSE SERPL-MCNC: 128 MG/DL (ref 65–140)
GLUCOSE SERPL-MCNC: 135 MG/DL (ref 65–140)
GLUCOSE SERPL-MCNC: 136 MG/DL (ref 65–140)
GLUCOSE SERPL-MCNC: 139 MG/DL (ref 65–140)

## 2021-02-04 PROCEDURE — 82948 REAGENT STRIP/BLOOD GLUCOSE: CPT

## 2021-02-04 PROCEDURE — 85730 THROMBOPLASTIN TIME PARTIAL: CPT | Performed by: SPECIALIST

## 2021-02-04 PROCEDURE — 99232 SBSQ HOSP IP/OBS MODERATE 35: CPT | Performed by: PHYSICIAN ASSISTANT

## 2021-02-04 RX ADMIN — SODIUM CHLORIDE, SODIUM LACTATE, POTASSIUM CHLORIDE, AND CALCIUM CHLORIDE 50 ML/HR: .6; .31; .03; .02 INJECTION, SOLUTION INTRAVENOUS at 23:34

## 2021-02-04 RX ADMIN — DONEPEZIL HYDROCHLORIDE 10 MG: 5 TABLET ORAL at 21:02

## 2021-02-04 RX ADMIN — ACETAMINOPHEN 650 MG: 325 TABLET, FILM COATED ORAL at 21:02

## 2021-02-04 RX ADMIN — AMLODIPINE BESYLATE 5 MG: 5 TABLET ORAL at 09:12

## 2021-02-04 RX ADMIN — AMIODARONE HYDROCHLORIDE 100 MG: 200 TABLET ORAL at 09:12

## 2021-02-04 RX ADMIN — APIXABAN 2.5 MG: 2.5 TABLET, FILM COATED ORAL at 09:19

## 2021-02-04 RX ADMIN — DULOXETINE 60 MG: 60 CAPSULE, DELAYED RELEASE ORAL at 09:12

## 2021-02-04 RX ADMIN — APIXABAN 2.5 MG: 2.5 TABLET, FILM COATED ORAL at 17:57

## 2021-02-04 RX ADMIN — MEMANTINE 5 MG: 5 TABLET ORAL at 09:12

## 2021-02-04 RX ADMIN — SODIUM CHLORIDE, SODIUM LACTATE, POTASSIUM CHLORIDE, AND CALCIUM CHLORIDE 50 ML/HR: .6; .31; .03; .02 INJECTION, SOLUTION INTRAVENOUS at 03:26

## 2021-02-04 NOTE — PLAN OF CARE
Problem: Potential for Falls  Goal: Patient will remain free of falls  Description: INTERVENTIONS:  - Assess patient frequently for physical needs  -  Identify cognitive and physical deficits and behaviors that affect risk of falls    -  [place] fall precautions as indicated by assessment   - Educate patient/family on patient safety including physical limitations  - Instruct patient to call for assistance with activity based on assessment  - Modify environment to reduce risk of injury  - Consider OT/PT consult to assist with strengthening/mobility  Outcome: Progressing

## 2021-02-04 NOTE — CASE MANAGEMENT
LOS - 2 days     SW following to assist with planning  Case discussed with surgery PA this morning  SW followed up with pt's daughter, Chris Stevenson, to see if she had received facility list emailed to her and if her family had decided on skilled facility choices  Per Chris Elva she did not receive the email and declined offer for SW to resend it  Daughter said they are considering possible return to [facility] but insisted SW not send referral to facility yet  Per daughter family is considering other options but she did not share what they were  SW requested family share plan once known so SW could assist with providing clinical to facility so they are prepared to care for pt when transferred  Chris Stevenson said she would have someone in the family communicate with SW when ready  SW will continue to follow to monitor progress and assist with planning as needed

## 2021-02-04 NOTE — PROGRESS NOTES
Progress Note - General Surgery   Rose Ribeiro 80 y o  female MRN: [id number]  Unit/Bed#: [address] [place] [address] Encounter: [id number]    Assessment  · Advanced ischemia of RLE with mottling extending from toes to above the knee  · Chronic atrial fibrillation  · NIDDM  · Hx of MRSA  · Demnita    Plan:  ·  Per vascular not recommending revascularization efftorts secondary to extent and duration ischemia  · Dr Nicky Peacock discussed at length with patients daughter's surgical options vs comfort care/hospice  Patient's family has chosen comfort care/hospice  · Patient is level 3 DNR/DNI  · Discharge planning  Possible discharge today pending placement  Discussed with daughter and case management  Subjective/Objective   Chief Complaint:" My leg is painful "     Subjective: Patient was seen and examined bedside  No acute changes through the night  Patient reports some RLE leg discomfort  Objective:    Blood pressure 145/64, pulse 80, temperature 98 5 °F (36 9 °C), temperature source Oral, resp  rate 15, height 5' (1 524 m), weight 54 kg (119 lb), SpO2 90 %  ,Body mass index is 23 24 kg/m²  Intake/Output Summary (Last 24 hours) at 2/4/2021 0945  Last data filed at 2/3/2021 2252  Gross per 24 hour   Intake 34 43 ml   Output --   Net 34 43 ml       Invasive Devices     Peripheral Intravenous Line            Peripheral IV 02/02/21 Left Wrist 1 day    Peripheral IV 02/02/21 Right Forearm 1 day                Physical Exam: /64   Pulse 80   Temp 98 5 °F (36 9 °C) (Oral)   Resp 15   Ht 5' (1 524 m)   Wt 54 kg (119 lb)   SpO2 90%   BMI 23 24 kg/m²   General appearance: alert  Lungs: clear to auscultation bilaterally  Heart: irregularly irregular rhythm  Extremities: RLE: mottled, cyanosis, no sensation or motor fucntion  Lab, Imaging and other studies:I have personally reviewed pertinent lab results    , CBC: No results found for: WBC, HGB, HCT, MCV, PLT, ADJUSTEDWBC, MCH, MCHC, RDW, MPV, NRBC, CMP: No results found for: SODIUM, K, CL, CO2, ANIONGAP, BUN, CREATININE, GLUCOSE, CALCIUM, AST, ALT, ALKPHOS, PROT, BILITOT, EGFR  VTE Pharmacologic Prophylaxis: Eliquis     VTE Mechanical Prophylaxis: sequential compression device

## 2021-02-04 NOTE — PLAN OF CARE
Problem: Potential for Falls  Goal: Patient will remain free of falls  Description: INTERVENTIONS:  - Assess patient frequently for physical needs  -  Identify cognitive and physical deficits and behaviors that affect risk of falls  -  [place] fall precautions as indicated by assessment   - Educate patient/family on patient safety including physical limitations  - Instruct patient to call for assistance with activity based on assessment  - Modify environment to reduce risk of injury  - Consider OT/PT consult to assist with strengthening/mobility  Outcome: Progressing     Problem: Prexisting or High Potential for Compromised Skin Integrity  Goal: Skin integrity is maintained or improved  Description: INTERVENTIONS:  - Identify patients at risk for skin breakdown  - Assess and monitor skin integrity  - Assess and monitor nutrition and hydration status  - Monitor labs   - Assess for incontinence   - Turn and reposition patient  - Assist with mobility/ambulation  - Relieve pressure over bony prominences  - Avoid friction and shearing  - Provide appropriate hygiene as needed including keeping skin clean and dry  - Evaluate need for skin moisturizer/barrier cream  - Collaborate with interdisciplinary team   - Patient/family teaching  - Consider wound care consult   Outcome: Progressing     Problem: Nutrition/Hydration-ADULT  Goal: Nutrient/Hydration intake appropriate for improving, restoring or maintaining nutritional needs  Description: Monitor and assess patient's nutrition/hydration status for malnutrition  Collaborate with interdisciplinary team and initiate plan and interventions as ordered  Monitor patient's weight and dietary intake as ordered or per policy  Utilize nutrition screening tool and intervene as necessary  Determine patient's food preferences and provide high-protein, high-caloric foods as appropriate       INTERVENTIONS:  - Monitor oral intake, urinary output, labs, and treatment plans  - Assess nutrition and hydration status and recommend course of action  - Evaluate amount of meals eaten  - Assist patient with eating if necessary   - Allow adequate time for meals  - Recommend/ encourage appropriate diets, oral nutritional supplements, and vitamin/mineral supplements  - Order, calculate, and assess calorie counts as needed  - Recommend, monitor, and adjust tube feedings and TPN/PPN based on assessed needs  - Assess need for intravenous fluids  - Provide specific nutrition/hydration education as appropriate  - Include patient/family/caregiver in decisions related to nutrition  Outcome: Progressing

## 2021-02-04 NOTE — CASE MANAGEMENT
SW following to assist with planning  Call received from pt's daughter, Cierra Weissjimbo  Per Arabella Ward she has spoken to [facility] admissions about pt's potential return and their ability to visit pt when she does  At this time daughter is requesting referral be made back to [facility] to plan pt's return on comfort care  Referral will be made  During conversation IMM reviewed with daughter  Offered ongoing support and assistance to daughter  SW will continue to follow to monitor progress and assist with transfer back when ready

## 2021-02-05 VITALS
TEMPERATURE: 97.8 F | HEIGHT: 60 IN | SYSTOLIC BLOOD PRESSURE: 161 MMHG | BODY MASS INDEX: 23.36 KG/M2 | WEIGHT: 119 LBS | HEART RATE: 71 BPM | RESPIRATION RATE: 18 BRPM | DIASTOLIC BLOOD PRESSURE: 57 MMHG | OXYGEN SATURATION: 95 %

## 2021-02-05 LAB
GLUCOSE SERPL-MCNC: 106 MG/DL (ref 65–140)
GLUCOSE SERPL-MCNC: 118 MG/DL (ref 65–140)
GLUCOSE SERPL-MCNC: 123 MG/DL (ref 65–140)
GLUCOSE SERPL-MCNC: 128 MG/DL (ref 65–140)

## 2021-02-05 PROCEDURE — 82948 REAGENT STRIP/BLOOD GLUCOSE: CPT

## 2021-02-05 PROCEDURE — 99238 HOSP IP/OBS DSCHRG MGMT 30/<: CPT | Performed by: PHYSICIAN ASSISTANT

## 2021-02-05 RX ORDER — OXYCODONE HYDROCHLORIDE 5 MG/1
2.5 TABLET ORAL EVERY 4 HOURS PRN
Qty: 10 TABLET | Refills: 0 | Status: SHIPPED | OUTPATIENT
Start: 2021-02-05 | End: 2021-02-10

## 2021-02-05 RX ADMIN — AMLODIPINE BESYLATE 5 MG: 5 TABLET ORAL at 11:12

## 2021-02-05 RX ADMIN — APIXABAN 2.5 MG: 2.5 TABLET, FILM COATED ORAL at 11:12

## 2021-02-05 RX ADMIN — MEMANTINE 5 MG: 5 TABLET ORAL at 11:12

## 2021-02-05 RX ADMIN — AMIODARONE HYDROCHLORIDE 100 MG: 200 TABLET ORAL at 11:12

## 2021-02-05 RX ADMIN — DULOXETINE 60 MG: 60 CAPSULE, DELAYED RELEASE ORAL at 11:12

## 2021-02-05 NOTE — DISCHARGE SUMMARY
Discharge Summary - Katerin Toro 80 y o  female MRN: [id number]    Unit/Bed#: [address] [place] [address] Encounter: [id number]    Admission Date: 2/2/2021   Discharge Date: 2/5/2021    Admitting Diagnosis:   Leg pain [M79 606]  Chronic atrial fibrillation ([address] ) [I48 20]  Ischemia of right lower extremity [I99 8]    Discharge Diagnoses: Principal Problem:    Ischemia of right lower extremity      Consultations:  Vascular surgery    Procedures/Imaging Performed:    Abdominal CTA with run off  Right lower extremity arterial duplex       HPI per admission H&P  Katerin Toro is a 80 y o  female with history of dementia, chronic AFib on Eliquis, and NIDDM who presents from [place] [address] with right leg pain for an unknown length of time  History, ROS and PFSH obtained from chart review, ED provider, and daughter Joanne Dong at bedside  Patient complains of right leg pain but cannot provide any further history  Daughter explains patient has never had anything like this before  Patient is nonambulatory at baseline        Hospital Course: Katerin Toro is a 80 y o  female admitted for right lower extremity pain and mottling  Right lower extremity arterial duplex showed JOSE ALFREDO of zero and severe stenosis vs total occlusion beginning at the bifurcation of the common femoral  Vascular surgery was consulted  CTA abdomen with runoff showed complete occlusion at the common femoral and below with only few collaterals from the internal iliac  Due to patient's duration and severity of symptoms, along with her extensive comorbidities and DNR/DNI status, between family and surgical providers it was decided revascularization efforts would be futile and family would not be interested in hip disarticulation or palliative above knee amputations  Hospice services were consulted, they determined she did not currently qualify for inpatient hospice  Patient was kept comfortable and a POLST was reviewed with daughter, Ev Mccrary   Patient was discharged to comfort care at [facility]  Condition at Discharge: stable      Discharge instructions/Information to patient and family:   See after visit summary for information provided to patient and family  Provisions for Follow-Up Care:  See after visit summary for information related to follow-up care and any pertinent home health orders  Disposition: Skilled nursing facility at [facility] Readmission: No    Discharge Statement   I spent 25 minutes discharging the patient  This time was spent on the day of discharge  I had direct contact with the patient on the day of discharge  Additional documentation is required if more than 30 minutes were spent on discharge  Discharge Medications:  See after visit summary for reconciled discharge medications provided to patient and family

## 2021-02-05 NOTE — CASE MANAGEMENT
Discharge ordered  Pt will be transferred back to [facility] for skilled comfort care  [facility] contacted to arrange stretcher transport  [place] scheduled to transport around 3:30 pm   RN (Perri), [facility] and pt's daughter Carlos Partida) aware of plan

## 2021-02-05 NOTE — NURSING NOTE
Patient discharged via transport team back to [facility]  from which she is a resident at  Patients IV taken out  Belongings sent with patient  All information reviewed with [facility] center about patient  All questions and concerns answered

## 2021-02-05 NOTE — [place] UNIVERSAL TRANSFER FORM
[place] UNIVERSAL TRANSFER FORM  (ALL ITEMS MUST BE COMPLETED)    1  TRANSFER FROM: [facility]      TRANSFER TO: [facility]     2  DATE OF TRANSFER: 2/5/2021                        TIME OF TRANSFER: 3:30pm    3  PATIENT NAME: JOHNNY Prince      YOB: 1929                             GENDER: female    4  LANGUAGE:   English    5  PHYSICIAN NAME:  Kiersten Moura MD                   PHONE: [phone]    6  CODE STATUS: Level 3 - DNAR and DNI        Out of Hospital DNR Attached: Yes    7  :                                      :  Extended Emergency Contact Information  Primary Emergency Contact: [address] [place] [address]  Mobile Phone: [phone]  Relation: Daughter  Secondary Emergency Contact: [facility]  Mobile Phone: [phone]  Relation: Daughter           Health Care Representative/Proxy:  Yes           Legal Guardian:  Yes             NAME OF:           HEALTH CARE REPRESENTATIVE/PROXY:                                         OR           LEGAL GUARDIAN, IF NOT :                                               PHONE:  (Day)           (Night)                        (Cell)    8  REASON FOR TRANSFER: (Must include brief medical history and recent changes in physical function or cognition ) patient confused, can't transfer self            V/S: /70 (BP Location: Left arm)   Pulse 78   Temp 97 8 °F (36 6 °C) (Oral)   Resp 19   Ht 5' (1 524 m)   Wt 54 kg (119 lb)   SpO2 94%   BMI 23 24 kg/m²           PAIN: None    9  PRIMARY DIAGNOSIS: Ischemia of right lower extremity      Secondary Diagnosis:         Pacemaker: No      Internal Defib: No          Mental Health Diagnosis (if Applicable):    10  RESTRAINTS: No     11  RESPIRATORY NEEDS: None    12  ISOLATION/PRECAUTION: None    13  ALLERGY: Patient has no known allergies      14  SENSORY: Vision Good, Hearing Poor and Speech Clear    15  SKIN CONDITION: No Wounds    16  DIET: Regular    17  IV ACCESS: None    18  PERSONAL ITEMS SENT WITH PATIENT: None    19  ATTACHED DOCUMENTS: MUST ATTACH CURRENT MEDICATION INFORMATION Face Sheet, MAR, Medication Reconciliation, Diagnostic Studies, Labs, Advanced Directive, Code Status and Discharge Summary    20  AT RISK ALERTS:Falls        HARM TO: N/A    21  WEIGHT BEARING STATUS:         Left Leg: Limited        Right Leg: None    22  MENTAL STATUS:Disoriented    23  FUNCTION:        Walk: With Help        Transfer: With Help        Toilet: With Help        Feed: With Help    24  IMMUNIZATIONS/SCREENING:     Immunization History   Administered Date(s) Administered    Influenza Split High Dose Preservative Free IM 10/22/2014, 10/01/2015    Influenza, seasonal, injectable 11/01/2007    Pneumococcal Conjugate 13-Valent 10/01/2015    Pneumococcal Polysaccharide PPV23 11/01/2007    Tdap 07/18/2014       25  BOWEL: Incontinent  and Date Last BM2/5/21    26  BLADDER: Incontinent    27   SENDING FACILITY CONTACT: [facility]                  Title: Hospital        Unit: [address] [place] [address]        Phone: [phone] [address] (if known):        Title:        Unit:         Phone:         FORM PREFILLED BY (if applicable)       Title:       Unit:        Phone:         FORM COMPLETED BY Anand Cintron RN      Title: Registered Nurse      Phone: [phone]

## 2021-02-05 NOTE — PLAN OF CARE
Problem: Potential for Falls  Goal: Patient will remain free of falls  Description: INTERVENTIONS:  - Assess patient frequently for physical needs  -  Identify cognitive and physical deficits and behaviors that affect risk of falls  -  [place] fall precautions as indicated by assessment   - Educate patient/family on patient safety including physical limitations  - Instruct patient to call for assistance with activity based on assessment  - Modify environment to reduce risk of injury  - Consider OT/PT consult to assist with strengthening/mobility  Outcome: Progressing     Problem: Prexisting or High Potential for Compromised Skin Integrity  Goal: Skin integrity is maintained or improved  Description: INTERVENTIONS:  - Identify patients at risk for skin breakdown  - Assess and monitor skin integrity  - Assess and monitor nutrition and hydration status  - Monitor labs   - Assess for incontinence   - Turn and reposition patient  - Assist with mobility/ambulation  - Relieve pressure over bony prominences  - Avoid friction and shearing  - Provide appropriate hygiene as needed including keeping skin clean and dry  - Evaluate need for skin moisturizer/barrier cream  - Collaborate with interdisciplinary team   - Patient/family teaching  - Consider wound care consult   Outcome: Progressing     Problem: Nutrition/Hydration-ADULT  Goal: Nutrient/Hydration intake appropriate for improving, restoring or maintaining nutritional needs  Description: Monitor and assess patient's nutrition/hydration status for malnutrition  Collaborate with interdisciplinary team and initiate plan and interventions as ordered  Monitor patient's weight and dietary intake as ordered or per policy  Utilize nutrition screening tool and intervene as necessary  Determine patient's food preferences and provide high-protein, high-caloric foods as appropriate       INTERVENTIONS:  - Monitor oral intake, urinary output, labs, and treatment plans  - Assess nutrition and hydration status and recommend course of action  - Evaluate amount of meals eaten  - Assist patient with eating if necessary   - Allow adequate time for meals  - Recommend/ encourage appropriate diets, oral nutritional supplements, and vitamin/mineral supplements  - Order, calculate, and assess calorie counts as needed  - Recommend, monitor, and adjust tube feedings and TPN/PPN based on assessed needs  - Assess need for intravenous fluids  - Provide specific nutrition/hydration education as appropriate  - Include patient/family/caregiver in decisions related to nutrition  Outcome: Progressing
